# Patient Record
Sex: MALE | Race: WHITE | NOT HISPANIC OR LATINO | Employment: FULL TIME | URBAN - METROPOLITAN AREA
[De-identification: names, ages, dates, MRNs, and addresses within clinical notes are randomized per-mention and may not be internally consistent; named-entity substitution may affect disease eponyms.]

---

## 2023-03-17 ENCOUNTER — TELEMEDICINE (OUTPATIENT)
Dept: PRIMARY CARE | Facility: CLINIC | Age: 43
End: 2023-03-17
Payer: COMMERCIAL

## 2023-03-17 DIAGNOSIS — R05.8 POST-VIRAL COUGH SYNDROME: Primary | ICD-10-CM

## 2023-03-17 PROBLEM — G47.33 OSA (OBSTRUCTIVE SLEEP APNEA): Status: ACTIVE | Noted: 2023-03-17

## 2023-03-17 PROBLEM — I10 HYPERTENSION: Status: ACTIVE | Noted: 2023-03-17

## 2023-03-17 PROBLEM — E03.9 HYPOTHYROIDISM: Status: ACTIVE | Noted: 2023-03-17

## 2023-03-17 PROBLEM — F41.8 SITUATIONAL ANXIETY: Status: ACTIVE | Noted: 2023-03-17

## 2023-03-17 PROBLEM — E66.9 OBESITY (BMI 30.0-34.9): Status: ACTIVE | Noted: 2023-03-17

## 2023-03-17 PROBLEM — F41.8 DEPRESSION WITH ANXIETY: Status: ACTIVE | Noted: 2023-03-17

## 2023-03-17 PROBLEM — E78.5 HYPERLIPIDEMIA: Status: ACTIVE | Noted: 2023-03-17

## 2023-03-17 PROBLEM — E66.811 OBESITY (BMI 30.0-34.9): Status: ACTIVE | Noted: 2023-03-17

## 2023-03-17 PROCEDURE — 99213 OFFICE O/P EST LOW 20 MIN: CPT | Performed by: STUDENT IN AN ORGANIZED HEALTH CARE EDUCATION/TRAINING PROGRAM

## 2023-03-17 RX ORDER — LEVOTHYROXINE SODIUM 125 UG/1
125 TABLET ORAL DAILY
COMMUNITY
End: 2024-04-04 | Stop reason: SDUPTHER

## 2023-03-17 RX ORDER — SERTRALINE HYDROCHLORIDE 100 MG/1
1.5 TABLET, FILM COATED ORAL NIGHTLY
COMMUNITY
Start: 2021-01-11 | End: 2024-04-01 | Stop reason: SDUPTHER

## 2023-03-17 RX ORDER — BENZONATATE 200 MG/1
200 CAPSULE ORAL 3 TIMES DAILY PRN
Qty: 30 CAPSULE | Refills: 0 | Status: SHIPPED | OUTPATIENT
Start: 2023-03-17 | End: 2023-03-27

## 2023-03-17 RX ORDER — LISINOPRIL AND HYDROCHLOROTHIAZIDE 10; 12.5 MG/1; MG/1
1 TABLET ORAL DAILY
COMMUNITY
Start: 2018-01-28 | End: 2023-03-31

## 2023-03-17 ASSESSMENT — ENCOUNTER SYMPTOMS
VOICE CHANGE: 0
COUGH: 1
FATIGUE: 0
WHEEZING: 0
ACTIVITY CHANGE: 0
CHEST TIGHTNESS: 0
SINUS PAIN: 0
SINUS PRESSURE: 0
TROUBLE SWALLOWING: 0
SHORTNESS OF BREATH: 0
RHINORRHEA: 0
APPETITE CHANGE: 0

## 2023-03-17 NOTE — PROGRESS NOTES
Subjective   Patient ID: Seamus Damon is a 43 y.o. male who presents for No chief complaint on file..    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   HENT:  Negative for congestion, ear pain, postnasal drip, rhinorrhea, sinus pressure, sinus pain, trouble swallowing and voice change.    Respiratory:  Positive for cough. Negative for chest tightness, shortness of breath and wheezing.    Cardiovascular:  Negative for chest pain.   Allergic/Immunologic: Negative for immunocompromised state.       Objective   There were no vitals taken for this visit.    Physical Exam  Constitutional:       Appearance: Normal appearance.   HENT:      Head: Normocephalic and atraumatic.      Nose: No congestion.      Mouth/Throat:      Pharynx: No posterior oropharyngeal erythema.   Pulmonary:      Effort: Pulmonary effort is normal. No respiratory distress.      Breath sounds: No wheezing.   Neurological:      Mental Status: He is alert and oriented to person, place, and time.   Psychiatric:         Mood and Affect: Mood normal.         Behavior: Behavior normal.       Assessment/Plan   1. Post-viral cough syndrome  No allergy, asthma, or GERD symptoms to account for cough  Will treat symptomatically, recommending if symptoms persist an in person eval to listen to lungs  Reviewed with patient symptomatic care including cool mist humidifier/nasal saline/OTC decongestant/rest/fluids/acetaminophen for fever or body ache.   Red flag symptoms such as increased WOB, lethargy, inability to tolerate PO intake, signs of dehydration, and fever not responsive to tylenol require ed follow up, understood by  pt  - benzonatate (Tessalon) 200 mg capsule; Take 1 capsule (200 mg) by mouth 3 times a day as needed for cough for up to 10 days. Do not crush or chew.  Dispense: 30 capsule; Refill: 0

## 2023-03-24 DIAGNOSIS — I10 HYPERTENSION, ESSENTIAL: Primary | ICD-10-CM

## 2023-03-31 RX ORDER — LISINOPRIL AND HYDROCHLOROTHIAZIDE 10; 12.5 MG/1; MG/1
1 TABLET ORAL DAILY
Qty: 90 TABLET | Refills: 3 | Status: SHIPPED | OUTPATIENT
Start: 2023-03-31 | End: 2023-12-28 | Stop reason: SDUPTHER

## 2023-12-28 DIAGNOSIS — I10 HYPERTENSION, ESSENTIAL: ICD-10-CM

## 2023-12-28 RX ORDER — LISINOPRIL AND HYDROCHLOROTHIAZIDE 10; 12.5 MG/1; MG/1
1 TABLET ORAL DAILY
Qty: 90 TABLET | Refills: 3 | Status: SHIPPED | OUTPATIENT
Start: 2023-12-28 | End: 2024-04-04 | Stop reason: SDUPTHER

## 2024-02-28 ENCOUNTER — APPOINTMENT (OUTPATIENT)
Dept: PRIMARY CARE | Facility: CLINIC | Age: 44
End: 2024-02-28
Payer: COMMERCIAL

## 2024-04-01 DIAGNOSIS — F41.8 DEPRESSION WITH ANXIETY: ICD-10-CM

## 2024-04-01 RX ORDER — SERTRALINE HYDROCHLORIDE 100 MG/1
150 TABLET, FILM COATED ORAL NIGHTLY
Qty: 45 TABLET | Refills: 0 | Status: SHIPPED | OUTPATIENT
Start: 2024-04-01 | End: 2024-04-04 | Stop reason: SDUPTHER

## 2024-04-03 PROBLEM — Z00.00 HEALTHCARE MAINTENANCE: Status: ACTIVE | Noted: 2024-04-03

## 2024-04-03 RX ORDER — CITALOPRAM 20 MG/1
20 TABLET, FILM COATED ORAL
COMMUNITY
End: 2024-04-04 | Stop reason: WASHOUT

## 2024-04-03 NOTE — PROGRESS NOTES
Subjective   Patient ID: Seamus Damon is a 44 y.o. male who presents for Annual Exam (Pt presents for annual physical exam- ABN was given to pt and signed, pt verbalized understanding. Pt states that he would like to discuss possible OCD instead of anxiety.).    HPI     Patient presents today for annual physical.  Patient is doing well overall, they have no new concerns or issues.     WELLNESS VISIT   TDAP: 11/2021  SHINGRIX: N/A  PNEUMOVAX: N/A  CSCOPE: 6/2023 (1 polyp, benign mucosa on bx, repeat 5 years per GI)  PSA: N/A (grandfather with hx of prostate cancer)  AAA SCREEN: N/A  HEP C SCREEN: none found  CACS: none found    Exercise:  some  Smoking: none       Vision:  needs checked     Prostate cancer screening: N/A due to age  Denies family history in first degree relative.  Denies hesitancy, nocturia, or urgency.     Colon cancer screening: UTD  Denies family history in first degree relative.  Denies melena, hematochezia, constipation, diarrhea, bloating, change in bowel habits.    ROUTINE VISIT  CHRONIC CONDITIONS:     Depression and anxiety  Taking Sertraline 150 mg daily for mood.     Thinks he might have some ocd,  intrusive thoughts dictating what number of things he can buy at store,    where he can set the volume on the radio     Mild, better than in past,   overcame specific numbers of things in the grocery store by forcing himself to buy a different number than what was in his mind -  uncomfortable (very) but he did it     Has a cousin with severe ocd     Mom's whole side has depression/ anxiety issues     PHQ-9:  8  MARYJANE-7:   8    Hypothyroidism  Current dose: Synthroid 125 mcg daily.  Last TSH 11.57 (H), T4 0.9 (normal) in 2/2023    Will repeat today      Hypertension  Presently on Lisinopril-HCTZ 10-12.5 mg daily     Hyperlipidemia  Lifestyle managed, not presently on statin or aspirin therapy.    02/2023 TC/HDL ratio 5.1, HDL 47, , TRIG 289  11/2021 TC/HDL ratio 4.5, HDL 42, LDL 89, TRIG  "294      Review of Systems   All other systems reviewed and are negative.      Objective   /90 (BP Location: Right arm, Patient Position: Sitting, BP Cuff Size: Adult)   Pulse 63   Temp 36.4 °C (97.5 °F) (Temporal)   Resp 14   Ht 1.727 m (5' 8\")   Wt 94.8 kg (209 lb 1.6 oz)   SpO2 97%   BMI 31.79 kg/m²     Physical Exam  Vitals and nursing note reviewed.   Constitutional:       General: He is not in acute distress.     Appearance: Normal appearance. He is not toxic-appearing.   HENT:      Head: Normocephalic and atraumatic.   Eyes:      Extraocular Movements: Extraocular movements intact.      Pupils: Pupils are equal, round, and reactive to light.   Neck:      Thyroid: No thyromegaly.      Vascular: No hepatojugular reflux or JVD.   Cardiovascular:      Rate and Rhythm: Normal rate and regular rhythm.      Heart sounds: No murmur heard.     No friction rub. No gallop.   Pulmonary:      Effort: Pulmonary effort is normal.      Breath sounds: Normal breath sounds. No wheezing, rhonchi or rales.   Abdominal:      General: Bowel sounds are normal. There is no distension.      Palpations: Abdomen is soft. There is no mass.      Tenderness: There is no abdominal tenderness. There is no guarding.   Musculoskeletal:      Right lower leg: No edema.      Left lower leg: No edema.   Lymphadenopathy:      Cervical: No cervical adenopathy.   Skin:     General: Skin is warm and dry.   Neurological:      General: No focal deficit present.      Mental Status: He is alert and oriented to person, place, and time.      Gait: Gait normal.   Psychiatric:         Mood and Affect: Mood normal.         Behavior: Behavior normal.         Assessment/Plan   Problem List Items Addressed This Visit             ICD-10-CM    Depression with anxiety F41.8    Relevant Medications    sertraline (Zoloft) 100 mg tablet    Hypothyroidism E03.9    Relevant Medications    levothyroxine (Synthroid, Levoxyl) 125 mcg tablet    Other Relevant " Orders    TSH with reflex to Free T4 if abnormal    Healthcare maintenance - Primary Z00.00    Relevant Orders    Lipid Panel    Comprehensive Metabolic Panel    CBC     Other Visit Diagnoses         Codes    Hypertension, essential     I10    Relevant Medications    lisinopriL-hydrochlorothiazide 10-12.5 mg tablet            Follow-up in 6 mo for scales,  lifestyle check in   Follow-up in 1 year for annual physical.   Call for sooner follow-up if needed.       Scribe Attestation  By signing my name below, IKasie Scribe   attest that this documentation has been prepared under the direction and in the presence of Clair Cobian DO.

## 2024-04-04 ENCOUNTER — OFFICE VISIT (OUTPATIENT)
Dept: PRIMARY CARE | Facility: CLINIC | Age: 44
End: 2024-04-04
Payer: COMMERCIAL

## 2024-04-04 ENCOUNTER — LAB (OUTPATIENT)
Dept: LAB | Facility: LAB | Age: 44
End: 2024-04-04
Payer: COMMERCIAL

## 2024-04-04 VITALS
TEMPERATURE: 97.5 F | HEIGHT: 68 IN | HEART RATE: 63 BPM | OXYGEN SATURATION: 97 % | DIASTOLIC BLOOD PRESSURE: 90 MMHG | SYSTOLIC BLOOD PRESSURE: 132 MMHG | BODY MASS INDEX: 31.69 KG/M2 | RESPIRATION RATE: 14 BRPM | WEIGHT: 209.1 LBS

## 2024-04-04 DIAGNOSIS — Z00.00 HEALTHCARE MAINTENANCE: ICD-10-CM

## 2024-04-04 DIAGNOSIS — I10 HYPERTENSION, ESSENTIAL: ICD-10-CM

## 2024-04-04 DIAGNOSIS — E03.9 HYPOTHYROIDISM, UNSPECIFIED TYPE: ICD-10-CM

## 2024-04-04 DIAGNOSIS — Z00.00 HEALTHCARE MAINTENANCE: Primary | ICD-10-CM

## 2024-04-04 DIAGNOSIS — F41.8 DEPRESSION WITH ANXIETY: ICD-10-CM

## 2024-04-04 LAB
ALBUMIN SERPL BCP-MCNC: 4.8 G/DL (ref 3.4–5)
ALP SERPL-CCNC: 61 U/L (ref 33–120)
ALT SERPL W P-5'-P-CCNC: 16 U/L (ref 10–52)
ANION GAP SERPL CALC-SCNC: 12 MMOL/L (ref 10–20)
AST SERPL W P-5'-P-CCNC: 16 U/L (ref 9–39)
BILIRUB SERPL-MCNC: 0.5 MG/DL (ref 0–1.2)
BUN SERPL-MCNC: 17 MG/DL (ref 6–23)
CALCIUM SERPL-MCNC: 9.9 MG/DL (ref 8.6–10.6)
CHLORIDE SERPL-SCNC: 100 MMOL/L (ref 98–107)
CHOLEST SERPL-MCNC: 214 MG/DL (ref 0–199)
CHOLESTEROL/HDL RATIO: 4.5
CO2 SERPL-SCNC: 31 MMOL/L (ref 21–32)
CREAT SERPL-MCNC: 0.88 MG/DL (ref 0.5–1.3)
EGFRCR SERPLBLD CKD-EPI 2021: >90 ML/MIN/1.73M*2
ERYTHROCYTE [DISTWIDTH] IN BLOOD BY AUTOMATED COUNT: 13.2 % (ref 11.5–14.5)
GLUCOSE SERPL-MCNC: 84 MG/DL (ref 74–99)
HCT VFR BLD AUTO: 45.9 % (ref 41–52)
HDLC SERPL-MCNC: 47.2 MG/DL
HGB BLD-MCNC: 15.2 G/DL (ref 13.5–17.5)
LDLC SERPL CALC-MCNC: 140 MG/DL
MCH RBC QN AUTO: 28.6 PG (ref 26–34)
MCHC RBC AUTO-ENTMCNC: 33.1 G/DL (ref 32–36)
MCV RBC AUTO: 86 FL (ref 80–100)
NON HDL CHOLESTEROL: 167 MG/DL (ref 0–149)
NRBC BLD-RTO: 0 /100 WBCS (ref 0–0)
PLATELET # BLD AUTO: 276 X10*3/UL (ref 150–450)
POTASSIUM SERPL-SCNC: 4.3 MMOL/L (ref 3.5–5.3)
PROT SERPL-MCNC: 7.4 G/DL (ref 6.4–8.2)
RBC # BLD AUTO: 5.32 X10*6/UL (ref 4.5–5.9)
SODIUM SERPL-SCNC: 139 MMOL/L (ref 136–145)
TRIGL SERPL-MCNC: 135 MG/DL (ref 0–149)
VLDL: 27 MG/DL (ref 0–40)
WBC # BLD AUTO: 6.8 X10*3/UL (ref 4.4–11.3)

## 2024-04-04 PROCEDURE — 84443 ASSAY THYROID STIM HORMONE: CPT

## 2024-04-04 PROCEDURE — 3080F DIAST BP >= 90 MM HG: CPT | Performed by: FAMILY MEDICINE

## 2024-04-04 PROCEDURE — 36415 COLL VENOUS BLD VENIPUNCTURE: CPT

## 2024-04-04 PROCEDURE — 1036F TOBACCO NON-USER: CPT | Performed by: FAMILY MEDICINE

## 2024-04-04 PROCEDURE — 80061 LIPID PANEL: CPT

## 2024-04-04 PROCEDURE — 80053 COMPREHEN METABOLIC PANEL: CPT

## 2024-04-04 PROCEDURE — 99396 PREV VISIT EST AGE 40-64: CPT | Performed by: FAMILY MEDICINE

## 2024-04-04 PROCEDURE — 85027 COMPLETE CBC AUTOMATED: CPT

## 2024-04-04 PROCEDURE — 3075F SYST BP GE 130 - 139MM HG: CPT | Performed by: FAMILY MEDICINE

## 2024-04-04 RX ORDER — LISINOPRIL AND HYDROCHLOROTHIAZIDE 10; 12.5 MG/1; MG/1
1 TABLET ORAL DAILY
Qty: 90 TABLET | Refills: 3 | Status: SHIPPED | OUTPATIENT
Start: 2024-04-04 | End: 2025-04-04

## 2024-04-04 RX ORDER — LEVOTHYROXINE SODIUM 125 UG/1
125 TABLET ORAL DAILY
Qty: 90 TABLET | Refills: 3 | Status: SHIPPED | OUTPATIENT
Start: 2024-04-04

## 2024-04-04 RX ORDER — SERTRALINE HYDROCHLORIDE 100 MG/1
200 TABLET, FILM COATED ORAL NIGHTLY
Qty: 180 TABLET | Refills: 3 | Status: SHIPPED | OUTPATIENT
Start: 2024-04-04

## 2024-04-04 ASSESSMENT — LIFESTYLE VARIABLES: HOW OFTEN DO YOU HAVE A DRINK CONTAINING ALCOHOL: 2-4 TIMES A MONTH

## 2024-04-05 LAB — TSH SERPL-ACNC: 3.79 MIU/L (ref 0.44–3.98)

## 2024-04-11 ASSESSMENT — ANXIETY QUESTIONNAIRES
1. FEELING NERVOUS, ANXIOUS, OR ON EDGE: SEVERAL DAYS
GAD7 TOTAL SCORE: 8
4. TROUBLE RELAXING: SEVERAL DAYS
3. WORRYING TOO MUCH ABOUT DIFFERENT THINGS: SEVERAL DAYS
2. NOT BEING ABLE TO STOP OR CONTROL WORRYING: SEVERAL DAYS
6. BECOMING EASILY ANNOYED OR IRRITABLE: NEARLY EVERY DAY
5. BEING SO RESTLESS THAT IT IS HARD TO SIT STILL: NOT AT ALL
7. FEELING AFRAID AS IF SOMETHING AWFUL MIGHT HAPPEN: SEVERAL DAYS

## 2024-04-11 ASSESSMENT — PATIENT HEALTH QUESTIONNAIRE - PHQ9
8. MOVING OR SPEAKING SO SLOWLY THAT OTHER PEOPLE COULD HAVE NOTICED. OR THE OPPOSITE, BEING SO FIGETY OR RESTLESS THAT YOU HAVE BEEN MOVING AROUND A LOT MORE THAN USUAL: NOT AT ALL
3. TROUBLE FALLING OR STAYING ASLEEP OR SLEEPING TOO MUCH: NEARLY EVERY DAY
2. FEELING DOWN, DEPRESSED OR HOPELESS: SEVERAL DAYS
7. TROUBLE CONCENTRATING ON THINGS, SUCH AS READING THE NEWSPAPER OR WATCHING TELEVISION: SEVERAL DAYS
9. THOUGHTS THAT YOU WOULD BE BETTER OFF DEAD, OR OF HURTING YOURSELF: NOT AT ALL
1. LITTLE INTEREST OR PLEASURE IN DOING THINGS: NOT AT ALL
SUM OF ALL RESPONSES TO PHQ9 QUESTIONS 1 AND 2: 1
SUM OF ALL RESPONSES TO PHQ QUESTIONS 1-9: 8
6. FEELING BAD ABOUT YOURSELF - OR THAT YOU ARE A FAILURE OR HAVE LET YOURSELF OR YOUR FAMILY DOWN: NOT AT ALL
4. FEELING TIRED OR HAVING LITTLE ENERGY: MORE THAN HALF THE DAYS
5. POOR APPETITE OR OVEREATING: SEVERAL DAYS

## 2024-06-10 NOTE — PROGRESS NOTES
Subjective        Seamus Damon is a 44 y.o. male who presents for      HPI:    Dr Cobian pt       Chief Complaint   Patient presents with    Hemorrhoids     Intermittent anal pain and itching with and without bowel movements. Blood when wiping x approx 1 month  Pt has tried OTC tucks pads-provides relief but does not alleviate sxs all together  Pt denies nausea, vomiting, diarrhea, blood in stool  No hemorrhoids observed during colonoscopy on 07/20/2023  Pt has not been treated elsewhere for these sxs       Reviewed colonoscopy report in chart                 Social History     Tobacco Use   Smoking Status Never   Smokeless Tobacco Never         Review of Systems:        Objective        Vitals:    06/11/24 1429   BP: 124/87   BP Location: Left arm   Patient Position: Sitting   BP Cuff Size: Adult   Pulse: 72   Resp: 14   Temp: 36.7 °C (98.1 °F)   SpO2: 97%   Weight: 95.9 kg (211 lb 6.4 oz)       Pt is A and O x3, NAD, nontoxic, well-hydrated   CV- RRR without murmur  PULM- CTA bilaterally, normal respiratory effort  RESPIRATORY EFFORT- normal , no retractions or nasal flaring   ABD- normoactive BS's , soft, no HSM, no CVAT, NT   SKIN- anus- fissure at 6 o'clock- not currently bleeding ; small skin tag vs hemorrhoid at 11 o'clock; areas tender   PSYCH- pleasant, normal judgement and insight                    BP Readings from Last 3 Encounters:   06/11/24 124/87   04/04/24 132/90   02/06/23 131/83           Wt Readings from Last 3 Encounters:   06/11/24 95.9 kg (211 lb 6.4 oz)   04/04/24 94.8 kg (209 lb 1.6 oz)   02/06/23 94.3 kg (208 lb)         BMI Readings from Last 3 Encounters:   06/11/24 32.14 kg/m²   04/04/24 31.79 kg/m²   02/06/23 32.58 kg/m²     The number and complexity of problems addressed is considered moderate.  The amount and/or complexity of data reviewed and analyzed is considered moderate. The risk of complications and/or morbidity/mortality of patient is considered moderate. Overall,  this patient encounter is considered a moderate risk visit.    Pt aware cream prescribed is not typiccally used for hemorrhiods but not using steroid due to allergy  Cautioned on risks fof cream and how to use         Pt did not want a chaperone    Assessment/Plan      1. Hemorrhoids, unspecified hemorrhoid type  benzocaine-lidocaine-tetracaine cream    Referral to General Surgery      2. Anal fissure  Referral to General Surgery      3. Skin tag of anus  Referral to General Surgery          Orders Placed This Encounter   Procedures    Referral to General Surgery   Using topical cream to numb the area - patient aware to wear glove when applying and only use small amount             Refer general surgery       Call if no better or if symptoms worsen

## 2024-06-11 ENCOUNTER — OFFICE VISIT (OUTPATIENT)
Dept: PRIMARY CARE | Facility: CLINIC | Age: 44
End: 2024-06-11
Payer: COMMERCIAL

## 2024-06-11 VITALS
SYSTOLIC BLOOD PRESSURE: 124 MMHG | DIASTOLIC BLOOD PRESSURE: 87 MMHG | BODY MASS INDEX: 32.14 KG/M2 | TEMPERATURE: 98.1 F | RESPIRATION RATE: 14 BRPM | OXYGEN SATURATION: 97 % | WEIGHT: 211.4 LBS | HEART RATE: 72 BPM

## 2024-06-11 DIAGNOSIS — K60.2 ANAL FISSURE: ICD-10-CM

## 2024-06-11 DIAGNOSIS — K64.4 SKIN TAG OF ANUS: ICD-10-CM

## 2024-06-11 DIAGNOSIS — K64.9 HEMORRHOIDS, UNSPECIFIED HEMORRHOID TYPE: Primary | ICD-10-CM

## 2024-06-11 PROCEDURE — 3074F SYST BP LT 130 MM HG: CPT | Performed by: FAMILY MEDICINE

## 2024-06-11 PROCEDURE — 1036F TOBACCO NON-USER: CPT | Performed by: FAMILY MEDICINE

## 2024-06-11 PROCEDURE — 99214 OFFICE O/P EST MOD 30 MIN: CPT | Performed by: FAMILY MEDICINE

## 2024-06-11 PROCEDURE — 3079F DIAST BP 80-89 MM HG: CPT | Performed by: FAMILY MEDICINE

## 2024-06-18 ENCOUNTER — PREP FOR PROCEDURE (OUTPATIENT)
Dept: SURGERY | Facility: CLINIC | Age: 44
End: 2024-06-18

## 2024-06-18 ENCOUNTER — APPOINTMENT (OUTPATIENT)
Dept: SURGERY | Facility: CLINIC | Age: 44
End: 2024-06-18
Payer: COMMERCIAL

## 2024-06-18 VITALS
WEIGHT: 210.4 LBS | DIASTOLIC BLOOD PRESSURE: 82 MMHG | TEMPERATURE: 98.2 F | HEART RATE: 91 BPM | RESPIRATION RATE: 16 BRPM | BODY MASS INDEX: 31.89 KG/M2 | OXYGEN SATURATION: 97 % | SYSTOLIC BLOOD PRESSURE: 115 MMHG | HEIGHT: 68 IN

## 2024-06-18 DIAGNOSIS — K64.4 SKIN TAG OF ANUS: ICD-10-CM

## 2024-06-18 DIAGNOSIS — K62.0 ANAL POLYP: Primary | ICD-10-CM

## 2024-06-18 DIAGNOSIS — K60.2 ANAL FISSURE: ICD-10-CM

## 2024-06-18 DIAGNOSIS — K64.9 HEMORRHOIDS, UNSPECIFIED HEMORRHOID TYPE: ICD-10-CM

## 2024-06-18 PROCEDURE — 99205 OFFICE O/P NEW HI 60 MIN: CPT | Performed by: SURGERY

## 2024-06-18 PROCEDURE — 3079F DIAST BP 80-89 MM HG: CPT | Performed by: SURGERY

## 2024-06-18 PROCEDURE — 3074F SYST BP LT 130 MM HG: CPT | Performed by: SURGERY

## 2024-06-18 PROCEDURE — 1036F TOBACCO NON-USER: CPT | Performed by: SURGERY

## 2024-06-18 RX ORDER — HYDROCODONE BITARTRATE AND ACETAMINOPHEN 5; 325 MG/1; MG/1
1 TABLET ORAL EVERY 6 HOURS PRN
Qty: 12 TABLET | Refills: 0 | Status: SHIPPED | OUTPATIENT
Start: 2024-06-18 | End: 2024-06-21

## 2024-06-18 RX ORDER — SODIUM CHLORIDE, SODIUM LACTATE, POTASSIUM CHLORIDE, CALCIUM CHLORIDE 600; 310; 30; 20 MG/100ML; MG/100ML; MG/100ML; MG/100ML
100 INJECTION, SOLUTION INTRAVENOUS CONTINUOUS
OUTPATIENT
Start: 2024-06-18

## 2024-06-18 NOTE — PROGRESS NOTES
"History Of Present Illness :  Seamus Damon is a very pleasant 44 y.o. male, patient of Dr. Clair Cobian,  who presents on referral from Dr. Kathleen Hollingsworth, for an anorectal evaluation.  The patient was recently seen by Dr. Hollingsworth on 6/11/24, and that note was reviewed.  According to the note the patient presented with:     \"Intermittent anal pain and itching with and without bowel movements. Blood when wiping x approx 1 month  Pt has tried OTC tucks pads-provides relief but does not alleviate sxs all together  Pt denies nausea, vomiting, diarrhea, blood in stool  No hemorrhoids observed during colonoscopy on 07/20/2023  Pt has not been treated elsewhere for these sxs\"     On examination on that visit, the patient was found to have: \"SKIN- anus- fissure at 6 o'clock- not currently bleeding ; small skin tag vs hemorrhoid at 11 o'clock; areas tender\"     The patient had a recent colonoscopy less than a year ago on 7/20/2023 by Dr. Hyde at the [facility], this was essentially negative.  A 5 mm polyp was identified in the ascending colon with the pathology revealed unremarkable colonic mucosa.  There was no mention of hemorrhoidal disease.    To me, the patient notes a perianal lump since early May.  He has tried over-the-counter remedies such as Tucks and Preparation H without any sustained relief.  He notes an occasional sharp pain.  He notes constant itching.  He notes occasional blood on the toilet paper but no dripping of blood in the commode.  He is never had an anorectal or abdominal operation in the past.    Patient has a number of well-controlled medical problems including hypertension, hyperlipidemia, hypothyroidism, depression, anxiety, and DM.  His only operations have been wisdom tooth extraction and vasectomy.    Patient is  and has 2 sons.  He lives in [place].  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        Past Medical History   Past Medical History:   Diagnosis Date    Personal " history of other diseases of the circulatory system     History of hypertension    Personal history of other diseases of the nervous system and sense organs     History of sleep apnea    Personal history of other endocrine, nutritional and metabolic disease     History of obesity    Personal history of other endocrine, nutritional and metabolic disease     History of hypothyroidism    Personal history of other specified conditions     History of fatigue        Surgical History    No past surgical history on file.     Allergies   Allergies   Allergen Reactions    Feathers Other     Sinus mucous- down feathers    Prednisone Rash        Home Meds  Current Outpatient Medications   Medication Instructions    benzocaine-lidocaine-tetracaine cream Use topically - sparingly to outside of affected area only once daily    levothyroxine (SYNTHROID, LEVOXYL) 125 mcg, oral, Daily    lidocaine 3 % cream apply sparingly to affected area once daily, use glove    lisinopriL-hydrochlorothiazide 10-12.5 mg tablet 1 tablet, oral, Daily    sertraline (ZOLOFT) 200 mg, oral, Nightly        Family History    Family History   Problem Relation Name Age of Onset    Hyperlipidemia Mother      Hypertension Mother      Skin cancer Mother      Prostate cancer Paternal Grandfather      Heart failure Other Grandmother     Heart failure Other Grandfather     Coronary artery disease Paternal Great-Grandfather          Social History  Social History     Tobacco Use    Smoking status: Never    Smokeless tobacco: Never   Vaping Use    Vaping status: Never Used   Substance Use Topics    Alcohol use: Not Currently    Drug use: Never        Review Of Systems    Review of Systems    General: Not Present- Obesity, Cancer, HIV, MRSA, Recent Cold/Flu, Tired During the Day and VRE.  HEENT: Not Present- Migraine, Cataracts, Glaucoma, Macular Degeneration and Retinal Detachment.  Respiratory:Not Present- Asthma, Chronic Cough, Difficulty Breathing on Exertion,  Difficulty Breathing at Rest, Emphysema, Frequent Bronchitis, Home CPAP/BiPAP, Home Oxygen, Pulmonary Embolus, Pneumonia/TB  Cardiovascular: Not Present- Chest Pain, Congestive Heart Failure, Heart Attack, Coronary Artery Disease, Heart Stent, Internal Defibrillator, Irregular Heart Beat, Mitral Valve Prolapse, Murmur, Pacemaker and Peripheral Vascular Disease.  Gastrointestinal: Not Present- Heartburn, Hepatitis, Hiatal Hernia, Jaundice, Reflux, Stomach Ulcer and IBS.  Male Genitourinary: Not Present- Enlarged Prostate, Kidney Failure, Kidney Stones, Dialysis and Urinary Tract Infection.  Musculoskeletal: Not Present- Arthritis, Back Pain and Fibromyalgia.  Neurological: Not Present- Headaches, Numbness, Tingling, Seizures, Stroke,  Shunt and Weakness.  Psychiatric: Not Present-  Bipolar and Panic Attacks.  Endocrine: Not Present- Diabetes, Hyperthyroidism, and Low Blood Sugar.  Hematology: Not Present- Abnormal Bleeding, Anemia and Blood Clots.    Vitals  There were no vitals taken for this visit.     Physical Exam   General Complete Physical Exam    The physical exam findings are as follows:    General Appearance - Cooperative and Well groomed. Not in acute distress. Build & Nutrition - Well nourished.  Posture - Normal posture. Gait - Normal. Hydration - Well hydrated.    Integumentary  General Characteristics: Overall examination of the patient's skin reveals - no rashes, no suspicious lesions, no bruises and no evidence of scars. Color - normal coloration of skin. Skin Moisture - normal skin moisture. Temperature - normal  warmth is noted. Texture - normal skin texture.    Head and Neck  Head - normocephalic, atraumatic with no lesions or palpable masses.  Neck  Global Assessment - full range of motion. no bruit auscultated on the right, no bruit auscultated on the left, non-tender, no lymphadenopathy, no palpable mass on the right and no palpable mass on the left.  Trachea - midline.  Thyroid Gland  Characteristics - no palpable nodules, normal position, symmetric and smooth.    Eyes  Sclera/Conjunctiva - Bilateral - Normal. Pupil - Bilateral - Accommodating, Direct reaction to light normal and Equal.    ENMT  Global Assessment  Upon examination of the ears, nose, mouth and throat - examination of the oral cavity reveals normal lips, teeth, gums and oral mucosa and hard and soft palates, tongue, tonsils and posterior pharynx are moist and symmetric without lesions.    Chest and Lung Exam  Inspection: Shape - Symmetric. Movements - Symmetrical. Accessory muscles - No use of accessory muscles in breathing.  Percussion:  Quality and Intensity: - Percussion normal.  Palpation: - Palpation normal.  Auscultation:  Breath sounds: - Normal and equal bilaterally.  Adventitious sounds: - none bilaterally.    Cardiovascular  Inspection: Carotid artery - Bilateral - Inspection Normal.  Palpation/Percussion: Examination by palpation and percussion reveals - No Thrills.  Cardiac Borders - Normal.  Auscultation: Rhythm - Regular. Rate: Regular.  Heart Sounds - Normal heart sounds, S1 WNL and S2 WNL.  Murmurs & Other Heart Sounds: Auscultation of the heart reveals - No Murmurs or other extra heart sounds.    Peripheral Vascular  Lower Extremity: Inspection - Bilateral - No Varicose veins.  Palpation: Edema - Bilateral - No edema.    Musculoskeletal  Global Assessment  Right Upper Extremity - no deformities, masses or tenderness, no known fractures, normal strength and tone and  normal range of motion without pain. Left Upper Extremity - no deformities, masses or tenderness, no known fractures,  normal strength and tone and normal range of motion without pain. Right Lower Extremity - no deformities, masses or  tenderness, no known fractures, normal strength and tone and normal range of motion without pain. Left Lower  Extremity - no deformities, masses or tenderness, no known fractures, normal strength and tone and normal range  of  motion without pain.    Lymphatic  Head & Neck  General Head & Neck Lymphatics:  Bilateral: Description - Normal.  Axillary  General Axillary Region:  Bilateral: Description - Normal.  Femoral & Inguinal  Generalized Femoral & Inguinal Lymphatics:  Bilateral: Description - Normal.    Abdomen  Inspection: Inspection of the abdomen reveals - No Visible peristalsis, No Abnormal pulsations, No Paradoxical  movements and No Hernias.  Palpation/Percussion: Palpation and Percussion of the abdomen reveal - Non Tender, No Rebound tenderness, NoRigidity (guarding) and No Palpable abdominal masses.  Liver: - Normal.  Spleen: - Normal.  Auscultation: Auscultation of the abdomen reveals - Bowel sounds normal and No Abdominal bruits.  Surgical scars:  none    Inguinal and External Genitalia    The patient was examined supine, and standing, with and without Valsalva. There is no evidence of inguinal or femoral hernia or lymphadenopathy bilaterally. The testes are descended bilaterally and symmetric, with no masses, nodules, or tenderness.    Rectal    Freda, my medical assistant, assisted and chaperoned  Patient examined in the prone jackknife position on the proctoscopy table.    Anorectal Exam:     External -right anteriorly, there is an anal polyp which is firm and irregular measuring about 15 mm - 20 mm in sagittally and about 10 mm transversely -there is a small ulceration about 3 to 4 mm on the posterior aspect of this polyp; no evidence of fistula, fissure, or external hemorrhoid; otherwise normal external exam.    Internal - normal sphincter tone. No rectal mass.  Prostate is normal in size and is smooth with no nodules.    Residue - not melanotic and no blood.    Assessment/Plan   Mr. Damon has a symptomatic anal polyp right anteriorly, as described.    Recommendations:    Conservative therapy is not possible with regard to symptom relief, as the patient is allergic to prednisone.  This causes erythema multiforme.   Therefore, I do not advise using hydrocortisone cream.    Therefore, in order to relieve symptoms and establish diagnosis excision is indicated and recommended.      Will proceed with this on Friday, 6/28/2024 as an outpatient under general esthesia at [facility].  The patient be in the prone position.  This will be a rectal exam under anesthesia, diagnostic anoscopy, excision of anal polyp, and possible hemorrhoidal rubber band ligation.  He may have a bleeding component from mild internal hemorrhoids.  The anal polyp is not causing the bleeding.    Recent CBC and CMP from 4/4/2024 were normal.  Patient does not require preoperative EKG.    I have asked the patient to administer a fleets enema at home the morning of the operation prior to leaving for the hospital.    I will see the patient for a postoperative appointment on Tuesday, 7/9/2024 at my [facility] office.    For postoperative analgesia/pain relief, I recommend:     a.  Tylenol Extra Strength 500 mg with ibuprofen 600 mg (three, 200 mg Advil or Motrin tablets ) 4 times a day with food, on schedule, for 2-3 days, then as needed thereafter.      b.  Supplement with Norco 5/325, dispense #12 for more severe or breakthrough pain, as needed.  This has been electronically prescribed to your pharmacy.  Please  this prescription within 7 days of today's visit, or the pharmacist will not fill the prescription.    Anorectal Surgery Consent: The procedure was explained to the patient in detail, including the risks, benefits and alternatives. The risks include, but are not limited to, infection, abscess, bleeding, hematoma, seroma, poor wound healing, persistent or recurrent symptoms, need for further surgery, fistula, key-hole defect and fecal incontinence.  The patient agreed with the plan and signed the electronic consent.

## 2024-06-19 ENCOUNTER — ANESTHESIA EVENT (OUTPATIENT)
Dept: OPERATING ROOM | Facility: HOSPITAL | Age: 44
End: 2024-06-19
Payer: COMMERCIAL

## 2024-06-27 NOTE — PREPROCEDURE INSTRUCTIONS
Current Medications   Medication Instructions    levothyroxine (Synthroid, Levoxyl) 125 mcg tablet Take morning of surgery with sip of water, no other fluids    lisinopriL-hydrochlorothiazide 10-12.5 mg tablet Stop 1 day before surgery    sertraline (Zoloft) 100 mg tablet Continue until night before surgery          NPO Instructions: Nothing to eat after midnight tonight      Additional Instructions:     Enter through main entrance of [facility], located at [address] Proceed to registration, located in the back right hand corner. You will need your ID and insurance card for registration. Please ensure you have a responsible adult to drive you home.     Take a shower the morning of or night before your procedure. After you shower avoid lotions, powders, deodorants or anything applied to the skin. If you wear contacts or glasses, wear the glasses. If you do not have glasses, please bring a case for your contacts. You may wear hearing aids and dentures, bring a case for them or we will provide one. Make sure you wear something loose and comfortable. Keep in mind your surgery type and wear something that will accommodate incisions or bandages. Please remove all jewelry.     You may have up to 13.5 ounces of clear liquids 2 hours before *  your instructed ARRIVAL time  (10:00)* to the hospital. You may take medications discussed during phone call with a small sip of water.    For further questions Stevie HOUSER can be contacted at [phone] between 7AM-3PM.

## 2024-06-28 ENCOUNTER — HOSPITAL ENCOUNTER (OUTPATIENT)
Facility: HOSPITAL | Age: 44
Setting detail: OUTPATIENT SURGERY
Discharge: HOME | End: 2024-06-28
Attending: SURGERY | Admitting: SURGERY
Payer: COMMERCIAL

## 2024-06-28 ENCOUNTER — ANESTHESIA (OUTPATIENT)
Dept: OPERATING ROOM | Facility: HOSPITAL | Age: 44
End: 2024-06-28
Payer: COMMERCIAL

## 2024-06-28 VITALS
WEIGHT: 209.44 LBS | HEIGHT: 68 IN | RESPIRATION RATE: 16 BRPM | BODY MASS INDEX: 31.74 KG/M2 | TEMPERATURE: 96.8 F | SYSTOLIC BLOOD PRESSURE: 163 MMHG | DIASTOLIC BLOOD PRESSURE: 108 MMHG | OXYGEN SATURATION: 99 % | HEART RATE: 60 BPM

## 2024-06-28 DIAGNOSIS — K62.0 ANAL POLYP: Primary | ICD-10-CM

## 2024-06-28 PROCEDURE — 46220 EXCISE ANAL EXT TAG/PAPILLA: CPT | Performed by: SURGERY

## 2024-06-28 PROCEDURE — 93010 ELECTROCARDIOGRAM REPORT: CPT | Performed by: INTERNAL MEDICINE

## 2024-06-28 PROCEDURE — 2500000005 HC RX 250 GENERAL PHARMACY W/O HCPCS: Performed by: ANESTHESIOLOGIST ASSISTANT

## 2024-06-28 PROCEDURE — A46220 PR EXCISION SINGLE EXTERNAL PAPILLA OR TAG ANUS: Performed by: ANESTHESIOLOGIST ASSISTANT

## 2024-06-28 PROCEDURE — 3700000001 HC GENERAL ANESTHESIA TIME - INITIAL BASE CHARGE: Performed by: SURGERY

## 2024-06-28 PROCEDURE — 93005 ELECTROCARDIOGRAM TRACING: CPT | Mod: 59

## 2024-06-28 PROCEDURE — 3600000002 HC OR TIME - INITIAL BASE CHARGE - PROCEDURE LEVEL TWO: Performed by: SURGERY

## 2024-06-28 PROCEDURE — 2500000005 HC RX 250 GENERAL PHARMACY W/O HCPCS: Performed by: SURGERY

## 2024-06-28 PROCEDURE — 7100000009 HC PHASE TWO TIME - INITIAL BASE CHARGE: Performed by: SURGERY

## 2024-06-28 PROCEDURE — 3700000002 HC GENERAL ANESTHESIA TIME - EACH INCREMENTAL 1 MINUTE: Performed by: SURGERY

## 2024-06-28 PROCEDURE — 2500000004 HC RX 250 GENERAL PHARMACY W/ HCPCS (ALT 636 FOR OP/ED): Performed by: ANESTHESIOLOGIST ASSISTANT

## 2024-06-28 PROCEDURE — 7100000010 HC PHASE TWO TIME - EACH INCREMENTAL 1 MINUTE: Performed by: SURGERY

## 2024-06-28 PROCEDURE — 7100000002 HC RECOVERY ROOM TIME - EACH INCREMENTAL 1 MINUTE: Performed by: SURGERY

## 2024-06-28 PROCEDURE — 3600000007 HC OR TIME - EACH INCREMENTAL 1 MINUTE - PROCEDURE LEVEL TWO: Performed by: SURGERY

## 2024-06-28 PROCEDURE — 7100000001 HC RECOVERY ROOM TIME - INITIAL BASE CHARGE: Performed by: SURGERY

## 2024-06-28 PROCEDURE — A46220 PR EXCISION SINGLE EXTERNAL PAPILLA OR TAG ANUS: Performed by: ANESTHESIOLOGY

## 2024-06-28 PROCEDURE — 2720000007 HC OR 272 NO HCPCS: Performed by: SURGERY

## 2024-06-28 RX ORDER — ONDANSETRON HYDROCHLORIDE 2 MG/ML
4 INJECTION, SOLUTION INTRAVENOUS ONCE AS NEEDED
Status: DISCONTINUED | OUTPATIENT
Start: 2024-06-28 | End: 2024-06-28 | Stop reason: HOSPADM

## 2024-06-28 RX ORDER — SODIUM CHLORIDE, SODIUM LACTATE, POTASSIUM CHLORIDE, CALCIUM CHLORIDE 600; 310; 30; 20 MG/100ML; MG/100ML; MG/100ML; MG/100ML
100 INJECTION, SOLUTION INTRAVENOUS CONTINUOUS
Status: DISCONTINUED | OUTPATIENT
Start: 2024-06-28 | End: 2024-06-28 | Stop reason: HOSPADM

## 2024-06-28 RX ORDER — HYDROMORPHONE HYDROCHLORIDE 1 MG/ML
1 INJECTION, SOLUTION INTRAMUSCULAR; INTRAVENOUS; SUBCUTANEOUS EVERY 5 MIN PRN
Status: DISCONTINUED | OUTPATIENT
Start: 2024-06-28 | End: 2024-06-28 | Stop reason: HOSPADM

## 2024-06-28 RX ORDER — PROPOFOL 10 MG/ML
INJECTION, EMULSION INTRAVENOUS AS NEEDED
Status: DISCONTINUED | OUTPATIENT
Start: 2024-06-28 | End: 2024-06-28

## 2024-06-28 RX ORDER — DIPHENHYDRAMINE HYDROCHLORIDE 50 MG/ML
25 INJECTION INTRAMUSCULAR; INTRAVENOUS ONCE AS NEEDED
Status: DISCONTINUED | OUTPATIENT
Start: 2024-06-28 | End: 2024-06-28 | Stop reason: HOSPADM

## 2024-06-28 RX ORDER — HYDROCODONE BITARTRATE AND ACETAMINOPHEN 5; 325 MG/1; MG/1
1 TABLET ORAL EVERY 6 HOURS PRN
Qty: 12 TABLET | Refills: 0 | Status: SHIPPED | OUTPATIENT
Start: 2024-06-28

## 2024-06-28 RX ORDER — MORPHINE SULFATE 2 MG/ML
2 INJECTION, SOLUTION INTRAMUSCULAR; INTRAVENOUS EVERY 5 MIN PRN
Status: DISCONTINUED | OUTPATIENT
Start: 2024-06-28 | End: 2024-06-28 | Stop reason: HOSPADM

## 2024-06-28 RX ORDER — KETOROLAC TROMETHAMINE 30 MG/ML
INJECTION, SOLUTION INTRAMUSCULAR; INTRAVENOUS AS NEEDED
Status: DISCONTINUED | OUTPATIENT
Start: 2024-06-28 | End: 2024-06-28

## 2024-06-28 RX ORDER — METOPROLOL TARTRATE 1 MG/ML
5 INJECTION, SOLUTION INTRAVENOUS ONCE
Status: DISCONTINUED | OUTPATIENT
Start: 2024-06-28 | End: 2024-06-28 | Stop reason: HOSPADM

## 2024-06-28 RX ORDER — FENTANYL CITRATE 50 UG/ML
INJECTION, SOLUTION INTRAMUSCULAR; INTRAVENOUS AS NEEDED
Status: DISCONTINUED | OUTPATIENT
Start: 2024-06-28 | End: 2024-06-28

## 2024-06-28 RX ORDER — LIDOCAINE HCL/PF 100 MG/5ML
SYRINGE (ML) INTRAVENOUS AS NEEDED
Status: DISCONTINUED | OUTPATIENT
Start: 2024-06-28 | End: 2024-06-28

## 2024-06-28 RX ORDER — LABETALOL HYDROCHLORIDE 5 MG/ML
10 INJECTION, SOLUTION INTRAVENOUS ONCE AS NEEDED
Status: DISCONTINUED | OUTPATIENT
Start: 2024-06-28 | End: 2024-06-28 | Stop reason: HOSPADM

## 2024-06-28 RX ORDER — BUPIVACAINE HCL/EPINEPHRINE 0.5-1:200K
VIAL (ML) INJECTION AS NEEDED
Status: DISCONTINUED | OUTPATIENT
Start: 2024-06-28 | End: 2024-06-28 | Stop reason: HOSPADM

## 2024-06-28 RX ORDER — LIDOCAINE HYDROCHLORIDE 10 MG/ML
INJECTION INFILTRATION; PERINEURAL AS NEEDED
Status: DISCONTINUED | OUTPATIENT
Start: 2024-06-28 | End: 2024-06-28 | Stop reason: HOSPADM

## 2024-06-28 RX ORDER — MEPERIDINE HYDROCHLORIDE 25 MG/ML
12.5 INJECTION INTRAMUSCULAR; INTRAVENOUS; SUBCUTANEOUS EVERY 10 MIN PRN
Status: DISCONTINUED | OUTPATIENT
Start: 2024-06-28 | End: 2024-06-28 | Stop reason: HOSPADM

## 2024-06-28 RX ORDER — SUCCINYLCHOLINE CHLORIDE 20 MG/ML INJECTION SOLUTION
SOLUTION AS NEEDED
Status: DISCONTINUED | OUTPATIENT
Start: 2024-06-28 | End: 2024-06-28

## 2024-06-28 RX ORDER — HYDRALAZINE HYDROCHLORIDE 20 MG/ML
10 INJECTION INTRAMUSCULAR; INTRAVENOUS EVERY 30 MIN PRN
Status: DISCONTINUED | OUTPATIENT
Start: 2024-06-28 | End: 2024-06-28 | Stop reason: HOSPADM

## 2024-06-28 RX ORDER — ALBUTEROL SULFATE 0.83 MG/ML
2.5 SOLUTION RESPIRATORY (INHALATION) ONCE AS NEEDED
Status: DISCONTINUED | OUTPATIENT
Start: 2024-06-28 | End: 2024-06-28 | Stop reason: HOSPADM

## 2024-06-28 RX ORDER — GLYCOPYRROLATE 0.2 MG/ML
INJECTION INTRAMUSCULAR; INTRAVENOUS AS NEEDED
Status: DISCONTINUED | OUTPATIENT
Start: 2024-06-28 | End: 2024-06-28

## 2024-06-28 RX ORDER — FAMOTIDINE 10 MG/ML
20 INJECTION INTRAVENOUS ONCE
Status: DISCONTINUED | OUTPATIENT
Start: 2024-06-28 | End: 2024-06-28 | Stop reason: HOSPADM

## 2024-06-28 RX ORDER — MIDAZOLAM HYDROCHLORIDE 1 MG/ML
INJECTION, SOLUTION INTRAMUSCULAR; INTRAVENOUS AS NEEDED
Status: DISCONTINUED | OUTPATIENT
Start: 2024-06-28 | End: 2024-06-28

## 2024-06-28 RX ORDER — ROCURONIUM BROMIDE 10 MG/ML
INJECTION, SOLUTION INTRAVENOUS AS NEEDED
Status: DISCONTINUED | OUTPATIENT
Start: 2024-06-28 | End: 2024-06-28

## 2024-06-28 RX ORDER — ONDANSETRON HYDROCHLORIDE 2 MG/ML
INJECTION, SOLUTION INTRAVENOUS AS NEEDED
Status: DISCONTINUED | OUTPATIENT
Start: 2024-06-28 | End: 2024-06-28

## 2024-06-28 RX ORDER — ACETAMINOPHEN 325 MG/1
975 TABLET ORAL ONCE
Status: DISCONTINUED | OUTPATIENT
Start: 2024-06-28 | End: 2024-06-28 | Stop reason: HOSPADM

## 2024-06-28 RX ORDER — MIDAZOLAM HYDROCHLORIDE 1 MG/ML
1 INJECTION, SOLUTION INTRAMUSCULAR; INTRAVENOUS ONCE AS NEEDED
Status: DISCONTINUED | OUTPATIENT
Start: 2024-06-28 | End: 2024-06-28 | Stop reason: HOSPADM

## 2024-06-28 RX ORDER — SCOLOPAMINE TRANSDERMAL SYSTEM 1 MG/1
1 PATCH, EXTENDED RELEASE TRANSDERMAL ONCE
Status: DISCONTINUED | OUTPATIENT
Start: 2024-06-28 | End: 2024-06-28 | Stop reason: HOSPADM

## 2024-06-28 SDOH — HEALTH STABILITY: MENTAL HEALTH: CURRENT SMOKER: 0

## 2024-06-28 ASSESSMENT — COLUMBIA-SUICIDE SEVERITY RATING SCALE - C-SSRS
6. HAVE YOU EVER DONE ANYTHING, STARTED TO DO ANYTHING, OR PREPARED TO DO ANYTHING TO END YOUR LIFE?: NO
2. HAVE YOU ACTUALLY HAD ANY THOUGHTS OF KILLING YOURSELF?: NO
1. IN THE PAST MONTH, HAVE YOU WISHED YOU WERE DEAD OR WISHED YOU COULD GO TO SLEEP AND NOT WAKE UP?: NO

## 2024-06-28 ASSESSMENT — PAIN - FUNCTIONAL ASSESSMENT
PAIN_FUNCTIONAL_ASSESSMENT: 0-10

## 2024-06-28 ASSESSMENT — PAIN SCALES - GENERAL
PAINLEVEL_OUTOF10: 0 - NO PAIN

## 2024-06-28 NOTE — ANESTHESIA POSTPROCEDURE EVALUATION
Patient: Seamus Damon    Procedure Summary       Date: 06/28/24 Room / Location: [facility] OR 08 / Virtual [facility] OR    Anesthesia Start: 1124 Anesthesia Stop: 1212    Procedure: RECTAL EXAM/ DIAGNOSTIC ANOSCOPY/ ANAL POLY EXCISION/ (Anus) Diagnosis:       Anal polyp      (Anal polyp [K62.0])    Surgeons: Mamadou Hernandez MD Responsible Provider: Julian Fulton MD    Anesthesia Type: general ASA Status: 3            Anesthesia Type: general    Vitals Value Taken Time   /103 06/28/24 1210   Temp 36.0 06/28/24 1212   Pulse 93 06/28/24 1212   Resp 14 06/28/24 1212   SpO2 100 06/28/24 1212   Vitals shown include unfiled device data.    Anesthesia Post Evaluation    Patient participation: complete - patient participated  Level of consciousness: awake  Pain management: adequate  Airway patency: patent  Cardiovascular status: acceptable  Respiratory status: acceptable  Hydration status: acceptable  Postoperative Nausea and Vomiting: none        No notable events documented.

## 2024-06-28 NOTE — ANESTHESIA PROCEDURE NOTES
Airway  Date/Time: 6/28/2024 11:32 AM  Urgency: elective    Airway not difficult    Staffing  Performed: IVAN   Authorized by: Julian Fulton MD    Performed by: IVAN Bennett  Patient location during procedure: OR    Indications and Patient Condition  Indications for airway management: anesthesia and airway protection  Spontaneous ventilation: present  Sedation level: deep  Preoxygenated: yes  MILS maintained throughout  Mask difficulty assessment: 0 - not attempted    Final Airway Details  Final airway type: endotracheal airway      Successful airway: ETT  Cuffed: yes   Successful intubation technique: direct laryngoscopy  Facilitating devices/methods: cricoid pressure  Endotracheal tube insertion site: oral  Blade: Shelley  Blade size: #4  ETT size (mm): 8.0  Cormack-Lehane Classification: grade I - full view of glottis  Placement verified by: chest auscultation and capnometry   Measured from: teeth  ETT to teeth (cm): 19  Number of attempts at approach: 1  Number of other approaches attempted: 0

## 2024-06-28 NOTE — ANESTHESIA PREPROCEDURE EVALUATION
Patient: Seamus Damon    Procedure Information       Date/Time: 06/28/24 1125    Procedure: RECTAL EXAM/ DIAGNOSTIC ANOSCOPY/ ANAL POLY EXCISION/ POSSIBLE HEMORRHOIDAL RUBBER BAND LIGATION (Anus) - Rectal exam under anesthesia; diagnostic anoscopy; excision of anal polyp; possible hemorrhoidal rubber band ligation; general anesthesia; prone position; please have suction rubber band ligator in the room    Location: [facility] / [facility]    Surgeons: Mamadou Hernandez MD            Relevant Problems   Anesthesia   (-) Difficult intubation   (-) PONV (postoperative nausea and vomiting)      Cardiac   (+) Hyperlipidemia   (+) Hypertension      Pulmonary   (+) DM (obstructive sleep apnea)      Neuro   (+) Depression with anxiety   (+) Situational anxiety      Endocrine   (+) Hypothyroidism       Clinical information reviewed:    Allergies  Meds               NPO Detail:  NPO/Void Status  Date of Last Liquid: 06/28/24  Time of Last Liquid: 0600  Date of Last Solid: 06/27/24  Time of Last Solid: 1300  Last Intake Type: Clear fluids         Physical Exam    Airway  Mallampati: III  TM distance: >3 FB  Neck ROM: full     Cardiovascular - normal exam  Rhythm: regular  Rate: normal     Dental    Pulmonary - normal exam     Abdominal            Anesthesia Plan    History of general anesthesia?: yes  History of complications of general anesthesia?: no    ASA 3     general     The patient is not a current smoker.  Education provided regarding risk of obstructive sleep apnea.  intravenous induction   Postoperative administration of opioids is intended.  Trial extubation is planned.  Anesthetic plan and risks discussed with patient.    Plan discussed with CRNA, CAA and attending.

## 2024-06-28 NOTE — OP NOTE
RECTAL EXAM/ DIAGNOSTIC ANOSCOPY/ ANAL POLY EXCISION/ POSSIBLE HEMORRHOIDAL RUBBER BAND LIGATION Operative Note     Date: 2024  OR Location: [facility]    Name: Seamus Damon : 1980, Age: 44 y.o., MRN: [id number], Sex: male    Diagnosis  Pre-op Diagnosis     * Anal polyp [K62.0] Post-op Diagnosis     * Anal polyp [K62.0]     Procedures    Rectal exam under anesthesia    Diagnostic anoscopy    Excision of anal polyp      Surgeons      * Mamadou Hernandez - Primary    Resident/Fellow/Other Assistant:  RONALD Holcomb    Procedure Summary  Anesthesia: Anesthesia type not filed in the log.  ASA: ASA status not filed in the log.  Anesthesia Staff: Anesthesiologist: Julain Fulton MD  Estimated Blood Loss: < 3 mL  Intra-op Medications: Administrations occurring from 1125 to 1255 on 24:  * No intraprocedure medications in log *        Specimen: See below    Staff:   Circulator:   Scrub Person:          Drains and/or Catheters: * None in log *    Tourniquet Times:         Implants:     Findings: See below    Indications: Seamus Damon is an 44 y.o. male who is having surgery for Anal polyp [K62.0].     The patient was seen in the preoperative area. The risks, benefits, complications, treatment options, non-operative alternatives, expected recovery and outcomes were discussed with the patient. The possibilities of reaction to medication, pulmonary aspiration, injury to surrounding structures, bleeding, recurrent infection, the need for additional procedures, failure to diagnose a condition, and creating a complication requiring transfusion or operation were discussed with the patient. The patient concurred with the proposed plan, giving informed consent.  The site of surgery was properly noted/marked if necessary per policy. The patient has been actively warmed in preoperative area. Preoperative antibiotics are not indicated. Venous thrombosis prophylaxis have been ordered including bilateral sequential  compression devices    Procedure Details:     Findings:     Right anteriorly, there was an anal polyp which is firm and irregular measuring about 20 mm in sagittally and about 10 mm transversely; this was ulcerated in the office but the ulcer is since healed    External inspection otherwise was negative with no evidence of fissure, fistula, or external hemorrhoid    Diagnostic anoscopy was otherwise negative; there is no significant hemorrhoidal disease;  no mucosal lesions      Specimens:  Right anterior anal polyp    Procedure:      The patient was taken to the operating room.  Preoperative huddle was accomplished with the OR team and the patient.  Satisfactory general endotracheal anesthesia was achieved while the patient was in the supine position on the operating room cart.  The patient was then placed on the operating room table in the prone jackknife position, with the standard & proper positioning and cushioning. The anal and perianal region was then prepared and draped in normal sterile fashion. After the appropriate timeout, the case commenced.    Inspection was followed by digital rectal exam and this was followed by diagnostic anoscopy. The above findings were noted.  A four-quadrant perianal block was then accomplished using a total of 25 cc of a 50-50 mixture of 1% lidocaine and 0.5% Marcaine with epinephrine    Inspection was followed by diagnostic anoscopy and the above findings were noted.    Using a Hill-Hanson retractor, the  right anterior anal canal was exposed. Proximal to the anal polyp, a 3-0 chromic suture was placed. Following this, the right anterior anal polyp was elliptically excised sharply and sent to pathology as a specimen. Hemostasis was obtained using minimal electrocautery. Using that same 3-0 chromic suture, the incision was approximated in a running locking fashion.    Following this, the anal canal was irrigated and suctioned. There was no evidence of bleeding.  Antibiotic  ointment was placed along the suture line.  A large piece of Xeroform was placed in the anal canal for hemostasis, followed by 4 x 4 gauze, ABD pad and mesh panties.    The patient tolerated the procedure well. Sponge, needle, and instrument counts were correct times 2. Total IVF were 1000 cc of crystalloid and EBL was < 3 cc.  The patient was then placed on the operating room cart in the supine position, extubated, and taken to the PACU with stable vital signs and in excellent condition.    Mamadou Hernandez M.D.  Complications:  None; patient tolerated the procedure well.    Disposition: PACU - hemodynamically stable.  Condition: stable         Additional Details: none    Attending Attestation: I performed the procedure.    Mamadou Hernandez  Phone Number: [phone]

## 2024-06-28 NOTE — DISCHARGE INSTRUCTIONS
For postoperative analgesia/pain relief, I recommend:      a.  Tylenol Extra Strength 500 mg with ibuprofen 600 mg (three, 200 mg Advil or Motrin tablets ) 4 times a day with food, on schedule, for 2-3 days, then as needed thereafter.       b.  Supplement with Norco 5/325, dispense #12 for more severe or breakthrough pain, as needed.

## 2024-06-28 NOTE — SIGNIFICANT EVENT
After visit summary reviewed with the pt and pt family members. Education was also provided regarding post op care by Dr Hernandez while at bedside. Pt and family does not further questions at this time. Pt wheelchair out by staff, Family/visitor was directed to drive the vehicle to main entrance to transport the pt home.

## 2024-07-01 ENCOUNTER — TELEPHONE (OUTPATIENT)
Dept: GASTROENTEROLOGY | Facility: CLINIC | Age: 44
End: 2024-07-01
Payer: COMMERCIAL

## 2024-07-01 LAB
ATRIAL RATE: 53 BPM
P AXIS: 21 DEGREES
P OFFSET: 182 MS
P ONSET: 128 MS
PR INTERVAL: 184 MS
Q ONSET: 220 MS
QRS COUNT: 8 BEATS
QRS DURATION: 90 MS
QT INTERVAL: 408 MS
QTC CALCULATION(BAZETT): 382 MS
QTC FREDERICIA: 391 MS
R AXIS: -5 DEGREES
T AXIS: 6 DEGREES
T OFFSET: 424 MS
VENTRICULAR RATE: 53 BPM

## 2024-07-01 ASSESSMENT — PAIN SCALES - GENERAL: PAINLEVEL_OUTOF10: 0 - NO PAIN

## 2024-07-01 NOTE — TELEPHONE ENCOUNTER
"Spoke with patient who states that he has been having sporatic blood in his stool, and also that \"I'm leaking stool in the sweat of my buttocks\".  I informed the patient I would send a message to Dr. Hernandez, and as soon as I hear back I'd be in contact.  Patient verbalized understanding.   "

## 2024-07-01 NOTE — TELEPHONE ENCOUNTER
Patient called today with post op questions - patient would like a call back from medical staff to discuss

## 2024-07-08 LAB
LABORATORY COMMENT REPORT: NORMAL
PATH REPORT.FINAL DX SPEC: NORMAL
PATH REPORT.GROSS SPEC: NORMAL
PATH REPORT.RELEVANT HX SPEC: NORMAL
PATH REPORT.TOTAL CANCER: NORMAL

## 2024-07-09 ENCOUNTER — APPOINTMENT (OUTPATIENT)
Dept: SURGERY | Facility: CLINIC | Age: 44
End: 2024-07-09
Payer: COMMERCIAL

## 2024-07-09 VITALS
WEIGHT: 210.4 LBS | OXYGEN SATURATION: 98 % | TEMPERATURE: 98.3 F | RESPIRATION RATE: 16 BRPM | HEIGHT: 68 IN | DIASTOLIC BLOOD PRESSURE: 95 MMHG | SYSTOLIC BLOOD PRESSURE: 146 MMHG | BODY MASS INDEX: 31.89 KG/M2 | HEART RATE: 65 BPM

## 2024-07-09 DIAGNOSIS — L91.8 FIBROEPITHELIAL POLYP: Primary | ICD-10-CM

## 2024-07-09 PROCEDURE — 3077F SYST BP >= 140 MM HG: CPT | Performed by: SURGERY

## 2024-07-09 PROCEDURE — 99024 POSTOP FOLLOW-UP VISIT: CPT | Performed by: SURGERY

## 2024-07-09 PROCEDURE — 3080F DIAST BP >= 90 MM HG: CPT | Performed by: SURGERY

## 2024-07-09 PROCEDURE — 1036F TOBACCO NON-USER: CPT | Performed by: SURGERY

## 2024-07-09 ASSESSMENT — PAIN SCALES - GENERAL: PAINLEVEL: 0-NO PAIN

## 2024-07-30 ENCOUNTER — APPOINTMENT (OUTPATIENT)
Dept: SURGERY | Facility: CLINIC | Age: 44
End: 2024-07-30
Payer: COMMERCIAL

## 2024-07-30 VITALS
DIASTOLIC BLOOD PRESSURE: 87 MMHG | OXYGEN SATURATION: 99 % | SYSTOLIC BLOOD PRESSURE: 134 MMHG | WEIGHT: 207 LBS | TEMPERATURE: 97.8 F | RESPIRATION RATE: 18 BRPM | BODY MASS INDEX: 31.37 KG/M2 | HEART RATE: 72 BPM | HEIGHT: 68 IN

## 2024-07-30 DIAGNOSIS — L91.8 FIBROEPITHELIAL POLYP: Primary | ICD-10-CM

## 2024-07-30 PROCEDURE — 3008F BODY MASS INDEX DOCD: CPT | Performed by: SURGERY

## 2024-07-30 PROCEDURE — 3075F SYST BP GE 130 - 139MM HG: CPT | Performed by: SURGERY

## 2024-07-30 PROCEDURE — 3079F DIAST BP 80-89 MM HG: CPT | Performed by: SURGERY

## 2024-07-30 PROCEDURE — 99024 POSTOP FOLLOW-UP VISIT: CPT | Performed by: SURGERY

## 2024-07-30 PROCEDURE — 1036F TOBACCO NON-USER: CPT | Performed by: SURGERY

## 2024-07-30 NOTE — PROGRESS NOTES
Second Post-Operative Office Visit    Patient feels well.  About a week after his first postoperative visit, his symptoms resolved.  He had no further drainage thereafter, pain swelling or other issues.  He feels back to normal essentially.    7/9/24:    Seamus Damon presents for his postoperative visit.  On 6/28/2024 the patient underwent the following procedure:     Rectal exam under anesthesia     Diagnostic anoscopy     Excision of anal polyp        Pathology revealed:     FINAL DIAGNOSIS   A. COLON, RECTUM /RIGHT ANTERIOR ANAL POLYP, ANOSCOPY/ANAL POLYP EXCISION:     --  FIBROEPITHELIAL POLYP.       His postoperative course has been unremarkable.  He took only Tylenol and ibuprofen on a regular basis.  He used no Norco.  He has noted some bloody drainage along the buttock crease.  This is not stained his underwear and he has not used a pad.  Bowel movements have been normal.     Vitals  There were no vitals taken for this visit.      Exam     Ano-Rectal Physical Exam     General  General Appearance - Not in acute distress.  Well-developed and well-nourished.  Alert and oriented time 3.     Sindy, my medical assistant, chaperoned and assisted  Patient examined in the prone jackknife position on the proctoscopy table.     Anorectal Exam:      External - the right anterior radial incision is completely healed and epithelialized    Internal -CRISSY is normal with normal sphincter tone, no masses or blood              Assessment and Plan     Mr. Damon continues to do very well postoperatively.    The anal incision is completely healed.     Recommendations:     No activity restrictions    Follow-up as needed    Practice good colon health:    a.  Avoid constipation and straining with bowel movements  b.  Stay well-hydrated - drink at least six, 8 ounce glasses of fluid daily  c.  High fiber diet  d.  Add supplemental fiber to your diet daily;  may use granulated formula such as Metamucil or Citrucel daily;  I  recommend Fiber Well gummies 2 daily which will provide 5 grams of supplemental fiber daily (these are easy to use, and can be purchased over-the-counter at [employer], or other pharmacies)

## 2024-10-23 ENCOUNTER — APPOINTMENT (OUTPATIENT)
Dept: PRIMARY CARE | Facility: CLINIC | Age: 44
End: 2024-10-23
Payer: COMMERCIAL

## 2024-10-23 VITALS
WEIGHT: 206.8 LBS | TEMPERATURE: 98.2 F | SYSTOLIC BLOOD PRESSURE: 127 MMHG | OXYGEN SATURATION: 95 % | HEART RATE: 65 BPM | DIASTOLIC BLOOD PRESSURE: 83 MMHG | BODY MASS INDEX: 31.44 KG/M2

## 2024-10-23 DIAGNOSIS — E66.811 CLASS 1 OBESITY WITH SERIOUS COMORBIDITY AND BODY MASS INDEX (BMI) OF 31.0 TO 31.9 IN ADULT, UNSPECIFIED OBESITY TYPE: ICD-10-CM

## 2024-10-23 DIAGNOSIS — I10 HYPERTENSION, UNSPECIFIED TYPE: ICD-10-CM

## 2024-10-23 DIAGNOSIS — Z78.9 INTOLERANCE OF CONTINUOUS POSITIVE AIRWAY PRESSURE (CPAP) VENTILATION: ICD-10-CM

## 2024-10-23 DIAGNOSIS — E78.5 HYPERLIPIDEMIA, UNSPECIFIED HYPERLIPIDEMIA TYPE: ICD-10-CM

## 2024-10-23 DIAGNOSIS — F41.8 DEPRESSION WITH ANXIETY: Primary | ICD-10-CM

## 2024-10-23 DIAGNOSIS — E03.9 HYPOTHYROIDISM, UNSPECIFIED TYPE: ICD-10-CM

## 2024-10-23 DIAGNOSIS — G47.33 OSA (OBSTRUCTIVE SLEEP APNEA): ICD-10-CM

## 2024-10-23 PROBLEM — E78.6 ELEVATED RATIO OF CHOLESTEROL TO HIGH DENSITY LIPOPROTEIN (HDL): Status: ACTIVE | Noted: 2024-10-23

## 2024-10-23 PROCEDURE — 99214 OFFICE O/P EST MOD 30 MIN: CPT | Performed by: FAMILY MEDICINE

## 2024-10-23 PROCEDURE — 3074F SYST BP LT 130 MM HG: CPT | Performed by: FAMILY MEDICINE

## 2024-10-23 PROCEDURE — 1036F TOBACCO NON-USER: CPT | Performed by: FAMILY MEDICINE

## 2024-10-23 PROCEDURE — 3079F DIAST BP 80-89 MM HG: CPT | Performed by: FAMILY MEDICINE

## 2024-10-23 ASSESSMENT — PATIENT HEALTH QUESTIONNAIRE - PHQ9
9. THOUGHTS THAT YOU WOULD BE BETTER OFF DEAD, OR OF HURTING YOURSELF: NOT AT ALL
SUM OF ALL RESPONSES TO PHQ QUESTIONS 1-9: 9
6. FEELING BAD ABOUT YOURSELF - OR THAT YOU ARE A FAILURE OR HAVE LET YOURSELF OR YOUR FAMILY DOWN: SEVERAL DAYS
4. FEELING TIRED OR HAVING LITTLE ENERGY: MORE THAN HALF THE DAYS
5. POOR APPETITE OR OVEREATING: MORE THAN HALF THE DAYS
3. TROUBLE FALLING OR STAYING ASLEEP OR SLEEPING TOO MUCH: NEARLY EVERY DAY
1. LITTLE INTEREST OR PLEASURE IN DOING THINGS: NOT AT ALL
SUM OF ALL RESPONSES TO PHQ9 QUESTIONS 1 AND 2: 1
2. FEELING DOWN, DEPRESSED OR HOPELESS: SEVERAL DAYS
7. TROUBLE CONCENTRATING ON THINGS, SUCH AS READING THE NEWSPAPER OR WATCHING TELEVISION: NOT AT ALL
8. MOVING OR SPEAKING SO SLOWLY THAT OTHER PEOPLE COULD HAVE NOTICED. OR THE OPPOSITE, BEING SO FIGETY OR RESTLESS THAT YOU HAVE BEEN MOVING AROUND A LOT MORE THAN USUAL: NOT AT ALL

## 2024-10-23 ASSESSMENT — ANXIETY QUESTIONNAIRES
GAD7 TOTAL SCORE: 10
3. WORRYING TOO MUCH ABOUT DIFFERENT THINGS: MORE THAN HALF THE DAYS
7. FEELING AFRAID AS IF SOMETHING AWFUL MIGHT HAPPEN: SEVERAL DAYS
6. BECOMING EASILY ANNOYED OR IRRITABLE: MORE THAN HALF THE DAYS
1. FEELING NERVOUS, ANXIOUS, OR ON EDGE: MORE THAN HALF THE DAYS
4. TROUBLE RELAXING: SEVERAL DAYS
5. BEING SO RESTLESS THAT IT IS HARD TO SIT STILL: NOT AT ALL
2. NOT BEING ABLE TO STOP OR CONTROL WORRYING: MORE THAN HALF THE DAYS

## 2024-10-23 NOTE — ASSESSMENT & PLAN NOTE
Medication assisted weight loss discussion held today, including GLP-1 agents.  Side effects include GI upset, nausea, diarrhea, constipation.  No prior issues with bowel habits or reflux.  No personal history of pancreatitis  No first degree relative with hx of thyroid cancer (uncle with thyroid cancer s/p resection, unsure of type).    Patient with elevated BMI in 31 range and comorbidities of HTN, DM, and HLD, I do feel he is good candidate for GLP-1. Unable to tolerate CPAP treatment for the DM. Weight reduction also recommended for treatment for sleep apnea would benefit from weight loss medication. Prescription for Zepbound sent to pharmacy today.     He is aware this will likely require PA and is ultimately up to insurance of they cover for indicated diagnoses.  Referral to clinical pharmacy placed today as well for medication assisted weight loss med management.  If approved they will aid in titration of the GLP-1.    Diet and exercise recommendations revisited. Lifestyle recommendations for overall wellness, as you are interested or able:     We recommend limitation of refined carbohydrates such as pasta, pretzels, chips, breads, bagels or cereals- particularly white flour containing.   Limit sugar sweetened foods or beverages, alcohol, pastries, candy, sports drinks or sodas.  (Water is best.)  Minimize diet drinks with artificial sugars.  If craving a sweetened food or beverage, two sweeteners from natural sources without substantial calories or glucose-raising properties are monk fruit extract or stevia.     Shoot for drinking 64 oz water daily unless conditions such as heart failure limit your recommended intake (Ask your doctor if you're not sure.)    Whole grain foods can be part of a healthy diet, and include such things as steel cut oats, brown rice, quinoa, millet, Jose M or other sprouted breads (often found in the freezer section), amaranth, teff, farrow., etc.  Wheat/buckwheat/spelt can be  added if you are not gluten-sensitive, and if these foods don't cause you bloating or symptoms of inflammation.       We do recommend eating lots of vegetables- 5 -7 servings of veggies daily, which is good fiber source as well as vitamins and minerals. (Think egg bake with spinach, peppers, onions, for breakfast,  celery with nut butter, or hummus and cucumbers as snacks,  salads with lunch and/or dinner, 1 - 2 veggies with lunch and or dinner,  etc)    Eat lean proteins -shoot for 70 - 100 g of protein per day unless you have restrictions from kidney disease, etc.  Think hard boiled eggs, beans, seeds/lentils, cottage cheese, consider protein powder such as whey or pea powder in steel cut oats or a homemade smoothie.     Eat some healthy fat daily, such as a handful of almonds, walnuts, pumpkin seeds, a serving of salmon, a splash of olive oil on your salad, an avocado.       Healthy nutritional choices decrease conditions like elevated cholesterol, elevated sugars, elevated weight, elevated blood pressure, and elevated inflammation.    Healthy choices can also lower risk of events such as strokes, heart attacks, and cancer.     Make most of your food intake plant- based.   Have occasional treats if you like, but try not to overindulge.     Some sort of heart-rate increasing movement or exercise is recommended 4 - 6 days per week, even if in 5 or 10 min increments several times throughout the day.     Include within that exercise time some strength/ resistance exercises at least 3 days per week.  This can be through use of resistance bands, free weights, machines, body weight lifting, heavy outdoor chores such as yardwork, mulching, chopping wood, etc.   Stretching/range of motion exercises should also be included as part of your exercise time - such as yoga, conor chi, or even just post- exercise stretching.  Moving exercised muscles beyond day to day usual activities can help you stay limber and flexible, decrease  injury risk, and minimize aches and pains with everyday movements.     Either in combination with exercise or just as a self-care quiet practice, spending time in nature has a wide range of positive effects on your health and wellbeing, including improved mood, improved blood pressure, and improved immune function.   Even 10 - 20 minutes a few days per week can make a difference.     Consider visiting [email or web address]     This site is a new partnership between [facility] and local physicians to include a prescription for nature as part of your self-care and wellness.      Healthy sleep ( 6 - 8 hours if possible) and  involvement in community (neighbors, family, senior center, hobby groups, marivel based realationships, etc) are also important parts of overall well being.      Pick one or two things to focus on per week or month and start building on your wellness promoting activities.   You deserve it!

## 2024-10-23 NOTE — ASSESSMENT & PLAN NOTE
Patient reports had sleep study years ago and tried CPAP therapy x 2 years but was ultimately unable to tolerate CPAP. Would be open to consult regarding dental apparatus for the sleep apnea. Patient also plans to work on weight reduction as weight loss can help improve sleep apnea. Diet and exercise recommendations revisited. Medication assisted weight loss discussion held today, patient with elevated BMI in 31 range and comorbidities of HTN, DM, and HLD, rx for Zepbound sent to pharmacy today. He is aware this will likely require PA and is ultimately up to insurance of they cover.

## 2024-10-23 NOTE — PROGRESS NOTES
Subjective   Patient ID: Seamus Damon is a 44 y.o. male who presents for Follow-up (Pt presents for 6 month follow up scales- pt states no concerns/questions at this time. ).    HPI     Patient presents today for 6 month follow-up.    Patient concerns:  Patient is doing well overall.     S/p resection of anal polyp via Dr. Hernandez  No further issues.    Would like to discuss medication assisted weight loss.  No hx of reflux or GI   No hx of pancreatitis  Uncle with hx of thyroid - unsure of type but no first degree relative.    ROUTINE VISIT  CHRONIC CONDITIONS:   Mood  Hx of depression and anxiety  Patient thinks may be some possible mild OCD  FMHx of anxiety/depression on mom's side, cousin with severe OCD    Current regimen:  Sertraline 200 mg daily    PHQ-9:     9  MARYJANE-7:   10    Wife currently experiencing some medical issues, does have him concerned but because this is making his wife anxious.    Admits could exercise more and increase veggies but tries to eat overall healthy, lots of fiber.  When does exercise will do some lifting or things outdoors.    Patient is taking and tolerating the medications as prescribed.   Patient denies suicidal ideation or homicidal ideation.   Patient denies any symptoms of raoul such as pressured speech, impulsive purchases.  Patient reports good control on the current medication.  Patient is satisfied with their current regimen and wishes to continue.     Hypothyroidism  Currently on Synthroid therapy via PCP    Last TSH in 4/2024 was normal  Current dose: Synthroid 125 mcg daily.    Medication is being taken as directed and is well-tolerated.   Patient denies heat or cold intolerance, diarrhea or constipation, coarsening of hair, and palpitations.     Hypertension  Presently on Lisinopril-HCTZ 10-12.5 mg daily    Medications are being taken and tolerated well. No side effects are reported.  BP pressures under good control outside of office as far as he knows.  Patient denies  chest pain, shortness of breath with exertion, palpitations, lower extremity edema, headache, or dizziness.     Hyperlipidemia  Lifestyle managed, not presently on statin or aspirin therapy.    04/2024 TC/HDL ratio 4.5, HDL 47, , TRIG 135  02/2023 TC/HDL ratio 5.1, HDL 47, , TRIG 289  11/2021 TC/HDL ratio 4.5, HDL 42, LDL 89, TRIG 294    DM  Had sleep study a while ago  Tried CPAP for 2 years, hated it, did not tolerate    Review of Systems   All other systems reviewed and are negative.      Objective   /83 (BP Location: Right arm, Patient Position: Sitting, BP Cuff Size: Adult)   Pulse 65   Temp 36.8 °C (98.2 °F) (Temporal)   Wt 93.8 kg (206 lb 12.8 oz)   SpO2 95%   BMI 31.44 kg/m²     Physical Exam  Vitals and nursing note reviewed.   Constitutional:       General: He is not in acute distress.     Appearance: Normal appearance. He is not toxic-appearing.   HENT:      Head: Normocephalic and atraumatic.   Neck:      Thyroid: No thyromegaly.   Cardiovascular:      Rate and Rhythm: Normal rate and regular rhythm.      Heart sounds: No murmur heard.     No friction rub. No gallop.   Pulmonary:      Effort: Pulmonary effort is normal.      Breath sounds: Normal breath sounds. No wheezing, rhonchi or rales.   Musculoskeletal:      Right lower leg: Edema (trace) present.      Left lower leg: Edema (trace) present.   Lymphadenopathy:      Cervical: No cervical adenopathy.   Skin:     General: Skin is warm and dry.   Neurological:      General: No focal deficit present.      Mental Status: He is alert and oriented to person, place, and time.      Gait: Gait normal.   Psychiatric:         Mood and Affect: Mood normal.         Behavior: Behavior normal.         Assessment/Plan   Problem List Items Addressed This Visit             ICD-10-CM    Depression with anxiety - Primary F41.8     Scales and scores have been reviewed and discussed, see results section.  Mood is stable and well controlled on  current regimen, continue Sertraline 200 mg daily.         Hyperlipidemia E78.5     04/2024 TC/HDL ratio 4.5, HDL 47, , TRIG 135  02/2023 TC/HDL ratio 5.1, HDL 47, , TRIG 289  11/2021 TC/HDL ratio 4.5, HDL 42, LDL 89, TRIG 294  Goal TC/HDL ratio 3.4 or less, LDL 99 or less,  or less, and TRIG 150 or less.     Lifestyle managed, not presently on statin or aspirin therapy.  Diet and exercise recommendations revisited.     To lower this with lifestyle measures:  -make sure you are avoiding refined carbs such as breads, pasta, cereal, candy, soda,  nutrition bars, granola, chips, and sugar sweetened beverages.      -eat 5- 7 servings daily of veggies,  healthy protein such as chicken, fish,  beans, and eggs, and include healthy fats in your diet such as seeds, nuts, olive oil, avocados, and salmon.   -exercise 4 - 6 days per week as you are able, 150 minutes total weekly divided up is recommended with 3-4 of those days including resistance/strength training.  -consider taking the fiber product psyllium capsules or powder 2 times daily (generic brand is fine) , or a brand name psyllium such as [facility] or Metamucil.  -consider also adding plant stanols and sterols such as Nature Made CholestOff, available over the counter.          Relevant Medications    tirzepatide, weight loss, (Zepbound) 2.5 mg/0.5 mL injection    Other Relevant Orders    Referral to Clinical Pharmacy    Hypertension I10     BP is 127/83 in office today, goal BP is 130/80 or less, ideally 120/80 or less.   BP is adequately controlled, continue the Lisinopril-HCTZ 10-12.5 mg daily.  Diet and exercise recommendations revisited.  Labs are utd, 4/2024, to be done annually for medication monitoring, or sooner if indicated otherwise.         Relevant Medications    tirzepatide, weight loss, (Zepbound) 2.5 mg/0.5 mL injection    Other Relevant Orders    Referral to Clinical Pharmacy    Hypothyroidism E03.9     Last TSH in 4/2024  was normal  To be checked annually when under good control and asymptomatic.  Continue present regimen as prescribed.    Current dose: Synthroid 125 mcg daily.         DM (obstructive sleep apnea) G47.33     Patient reports had sleep study years ago and tried CPAP therapy x 2 years but was ultimately unable to tolerate CPAP. Would be open to consult regarding dental apparatus for the sleep apnea. Patient also plans to work on weight reduction as weight loss can help improve sleep apnea. Diet and exercise recommendations revisited. Medication assisted weight loss discussion held today, patient with elevated BMI in 31 range and comorbidities of HTN, DM, and HLD, rx for Zepbound sent to pharmacy today. He is aware this will likely require PA and is ultimately up to insurance of they cover.         Relevant Medications    tirzepatide, weight loss, (Zepbound) 2.5 mg/0.5 mL injection    Other Relevant Orders    Referral to Clinical Pharmacy    Class 1 obesity with serious comorbidity and body mass index (BMI) of 31.0 to 31.9 in adult E66.811, Z68.31     Medication assisted weight loss discussion held today, including GLP-1 agents.  Side effects include GI upset, nausea, diarrhea, constipation.  No prior issues with bowel habits or reflux.  No personal history of pancreatitis  No first degree relative with hx of thyroid cancer (uncle with thyroid cancer s/p resection, unsure of type).    Patient with elevated BMI in 31 range and comorbidities of HTN, DM, and HLD, I do feel he is good candidate for GLP-1. Unable to tolerate CPAP treatment for the DM. Weight reduction also recommended for treatment for sleep apnea would benefit from weight loss medication. Prescription for Zepbound sent to pharmacy today.     He is aware this will likely require PA and is ultimately up to insurance of they cover for indicated diagnoses.  Referral to clinical pharmacy placed today as well for medication assisted weight loss med  management.  If approved they will aid in titration of the GLP-1.    Diet and exercise recommendations revisited. Lifestyle recommendations for overall wellness, as you are interested or able:     We recommend limitation of refined carbohydrates such as pasta, pretzels, chips, breads, bagels or cereals- particularly white flour containing.   Limit sugar sweetened foods or beverages, alcohol, pastries, candy, sports drinks or sodas.  (Water is best.)  Minimize diet drinks with artificial sugars.  If craving a sweetened food or beverage, two sweeteners from natural sources without substantial calories or glucose-raising properties are monk fruit extract or stevia.     Shoot for drinking 64 oz water daily unless conditions such as heart failure limit your recommended intake (Ask your doctor if you're not sure.)    Whole grain foods can be part of a healthy diet, and include such things as steel cut oats, brown rice, quinoa, millet, Jose M or other sprouted breads (often found in the freezer section), amaranth, teff, farrow., etc.  Wheat/buckwheat/spelt can be added if you are not gluten-sensitive, and if these foods don't cause you bloating or symptoms of inflammation.       We do recommend eating lots of vegetables- 5 -7 servings of veggies daily, which is good fiber source as well as vitamins and minerals. (Think egg bake with spinach, peppers, onions, for breakfast,  celery with nut butter, or hummus and cucumbers as snacks,  salads with lunch and/or dinner, 1 - 2 veggies with lunch and or dinner,  etc)    Eat lean proteins -shoot for 70 - 100 g of protein per day unless you have restrictions from kidney disease, etc.  Think hard boiled eggs, beans, seeds/lentils, cottage cheese, consider protein powder such as whey or pea powder in steel cut oats or a homemade smoothie.     Eat some healthy fat daily, such as a handful of almonds, walnuts, pumpkin seeds, a serving of salmon, a splash of olive oil on your salad, an  avocado.       Healthy nutritional choices decrease conditions like elevated cholesterol, elevated sugars, elevated weight, elevated blood pressure, and elevated inflammation.    Healthy choices can also lower risk of events such as strokes, heart attacks, and cancer.     Make most of your food intake plant- based.   Have occasional treats if you like, but try not to overindulge.     Some sort of heart-rate increasing movement or exercise is recommended 4 - 6 days per week, even if in 5 or 10 min increments several times throughout the day.     Include within that exercise time some strength/ resistance exercises at least 3 days per week.  This can be through use of resistance bands, free weights, machines, body weight lifting, heavy outdoor chores such as yardwork, mulching, chopping wood, etc.   Stretching/range of motion exercises should also be included as part of your exercise time - such as yoga, conor chi, or even just post- exercise stretching.  Moving exercised muscles beyond day to day usual activities can help you stay limber and flexible, decrease injury risk, and minimize aches and pains with everyday movements.     Either in combination with exercise or just as a self-care quiet practice, spending time in nature has a wide range of positive effects on your health and wellbeing, including improved mood, improved blood pressure, and improved immune function.   Even 10 - 20 minutes a few days per week can make a difference.     Consider visiting [email or web address]     This site is a new partnership between [facility] and local physicians to include a prescription for nature as part of your self-care and wellness.      Healthy sleep ( 6 - 8 hours if possible) and  involvement in community (neighbors, family, senior center, hobby groups, marivel based realationships, etc) are also important parts of overall well being.      Pick one or two things to focus on per week or month and start  building on your wellness promoting activities.   You deserve it!           Relevant Medications    tirzepatide, weight loss, (Zepbound) 2.5 mg/0.5 mL injection    Other Relevant Orders    Referral to Clinical Pharmacy    Intolerance of continuous positive airway pressure (CPAP) ventilation Z78.9    Relevant Orders    Referral to Clinical Pharmacy       Follow-up in 6 months for routine care.  Scales upon rooming.   Call for sooner follow-up if needed.         Scribe Attestation  By signing my name below, I, Purnima Serrano   attest that this documentation has been prepared under the direction and in the presence of Clair Cobian DO.

## 2024-10-23 NOTE — ASSESSMENT & PLAN NOTE
BP is 127/83 in office today, goal BP is 130/80 or less, ideally 120/80 or less.   BP is adequately controlled, continue the Lisinopril-HCTZ 10-12.5 mg daily.  Diet and exercise recommendations revisited.  Labs are utd, 4/2024, to be done annually for medication monitoring, or sooner if indicated otherwise.

## 2024-10-23 NOTE — ASSESSMENT & PLAN NOTE
Scales and scores have been reviewed and discussed, see results section.  Mood is stable and well controlled on current regimen, continue Sertraline 200 mg daily.

## 2024-10-23 NOTE — PATIENT INSTRUCTIONS
Hyperlipidemia  04/2024 TC/HDL ratio 4.5, HDL 47, , TRIG 135  02/2023 TC/HDL ratio 5.1, HDL 47, , TRIG 289  11/2021 TC/HDL ratio 4.5, HDL 42, LDL 89, TRIG 294  Goal TC/HDL ratio 3.4 or less, LDL 99 or less,  or less, and TRIG 150 or less.     Lifestyle managed, not presently on statin or aspirin therapy.  Diet and exercise recommendations revisited.     To lower this with lifestyle measures:  -make sure you are avoiding refined carbs such as breads, pasta, cereal, candy, soda,  nutrition bars, granola, chips, and sugar sweetened beverages.      -eat 5- 7 servings daily of veggies,  healthy protein such as chicken, fish,  beans, and eggs, and include healthy fats in your diet such as seeds, nuts, olive oil, avocados, and salmon.   -exercise 4 - 6 days per week as you are able, 150 minutes total weekly divided up is recommended with 3-4 of those days including resistance/strength training.  -consider taking the fiber product psyllium capsules or powder 2 times daily (generic brand is fine) , or a brand name psyllium such as [facility] or Metamucil.  -consider also adding plant stanols and sterols such as Nature Made CholestOff, available over the counter.     Depression with anxiety  Mood is stable and well controlled on current regimen, continue Sertraline 200 mg daily.    Hypothyroidism  Last TSH in 4/2024 was normal  To be checked annually when under good control and asymptomatic.  Continue present regimen as prescribed.    Current dose: Synthroid 125 mcg daily.    DM (obstructive sleep apnea)  Patient reports had sleep study years ago and tried CPAP therapy x 2 years but was ultimately unable to tolerate CPAP. Would be open to consult regarding dental apparatus for the sleep apnea. Patient also plans to work on weight reduction as weight loss can help improve sleep apnea. Diet and exercise recommendations revisited. Medication assisted weight loss discussion held today, patient with  elevated BMI in 31 range and comorbidities of HTN, DM, and HLD, rx for Zepbound sent to pharmacy today. He is aware this will likely require PA and is ultimately up to insurance of they cover.    Hypertension  BP is 127/83 in office today, goal BP is 130/80 or less, ideally 120/80 or less.   BP is adequately controlled, continue the Lisinopril-HCTZ 10-12.5 mg daily.  Diet and exercise recommendations revisited.  Labs are utd, 4/2024, to be done annually for medication monitoring, or sooner if indicated otherwise.    Zepbound sent in to pharmacy - clinical pharmacy will call you to set up an appointment to help secure medication for weight loss        Follow up in 6 mo for weight check on labs   (please have them done about a week before your next visit)     You might cnsider looking into [facility].      This is a local virtual/ telehealth clinic that partners with  compounding pharmacies to prescribe generic versions of medications such as Ozempic, Mounjaro, Androgel, etc.    They manage medication assisted weight loss and also male testosterone deficiency.       Their email contact is [email or web address]    The phone number is [phone]      Non-CPAP sleep apnea treatment -     A custom oral  appliance can be an effective alternative to CPAP for treatment of sleep apnea .    These devices are custom fitted by dentists with special expertise and training in treating sleep apnea. They fit in the mouth, similar to a retainer or mouth guard. They open your airway by moving your lower jaw and tongue forward while you sleep. They are small, comfortable and effective.    One such provider in our area is Dr. Monik Maya,  BDS, DMD, Diplomate, ABDSM.    She practices at [facility].    She is taking new patients and can set up a consultation to discuss further.     Phone [phone]  Fax: [phone] [address] , [place] [address]  Office Hours  Monday-Thursday:  9:00am-5:00pm    Follow up in 6 mo

## 2024-10-23 NOTE — ASSESSMENT & PLAN NOTE
Last TSH in 4/2024 was normal  To be checked annually when under good control and asymptomatic.  Continue present regimen as prescribed.    Current dose: Synthroid 125 mcg daily.

## 2024-10-23 NOTE — ASSESSMENT & PLAN NOTE
04/2024 TC/HDL ratio 4.5, HDL 47, , TRIG 135  02/2023 TC/HDL ratio 5.1, HDL 47, , TRIG 289  11/2021 TC/HDL ratio 4.5, HDL 42, LDL 89, TRIG 294  Goal TC/HDL ratio 3.4 or less, LDL 99 or less,  or less, and TRIG 150 or less.     Lifestyle managed, not presently on statin or aspirin therapy.  Diet and exercise recommendations revisited.     To lower this with lifestyle measures:  -make sure you are avoiding refined carbs such as breads, pasta, cereal, candy, soda,  nutrition bars, granola, chips, and sugar sweetened beverages.      -eat 5- 7 servings daily of veggies,  healthy protein such as chicken, fish,  beans, and eggs, and include healthy fats in your diet such as seeds, nuts, olive oil, avocados, and salmon.   -exercise 4 - 6 days per week as you are able, 150 minutes total weekly divided up is recommended with 3-4 of those days including resistance/strength training.  -consider taking the fiber product psyllium capsules or powder 2 times daily (generic brand is fine) , or a brand name psyllium such as [facility] or Metamucil.  -consider also adding plant stanols and sterols such as Nature Made CholestOff, available over the counter.

## 2024-10-26 ENCOUNTER — OFFICE VISIT (OUTPATIENT)
Dept: URGENT CARE | Age: 44
End: 2024-10-26
Payer: COMMERCIAL

## 2024-10-26 VITALS
HEART RATE: 60 BPM | WEIGHT: 207 LBS | BODY MASS INDEX: 31.37 KG/M2 | HEIGHT: 68 IN | OXYGEN SATURATION: 99 % | TEMPERATURE: 98.9 F | SYSTOLIC BLOOD PRESSURE: 143 MMHG | RESPIRATION RATE: 18 BRPM | DIASTOLIC BLOOD PRESSURE: 91 MMHG
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
## 2024-10-26 DIAGNOSIS — S20.369A TICK BITE OF CHEST WALL, INITIAL ENCOUNTER: Primary | ICD-10-CM

## 2024-10-26 RX ORDER — DOXYCYCLINE 100 MG/1
200 CAPSULE ORAL DAILY
Qty: 2 CAPSULE | Refills: 0 | Status: SHIPPED | OUTPATIENT
Start: 2024-10-26 | End: 2024-10-27

## 2024-10-26 ASSESSMENT — PAIN SCALES - GENERAL: PAINLEVEL_OUTOF10: 10-WORST PAIN EVER

## 2024-10-26 NOTE — PROGRESS NOTES
"Subjective   Patient ID: Seamus Damon is a 44 y.o. male. They present today with a chief complaint of Insect Bite.    History of Present Illness  HPI  Patient presents after removing a tick from his left upper chest last night.  He states that it was first noted the day before.  He denies any fevers or chills.  No body aches or joint pains.  No redness or swelling over the bite area.  Tick was removed intact and without difficulty.  Past Medical History  Allergies as of 10/26/2024 - Reviewed 10/26/2024   Allergen Reaction Noted    Feathers Other 10/25/2019    Prednisone Rash 03/17/2023       (Not in a hospital admission)       Past Medical History:   Diagnosis Date    Personal history of other diseases of the circulatory system     History of hypertension    Personal history of other diseases of the nervous system and sense organs     History of sleep apnea    Personal history of other endocrine, nutritional and metabolic disease     History of obesity    Personal history of other endocrine, nutritional and metabolic disease     History of hypothyroidism    Personal history of other specified conditions     History of fatigue       No past surgical history on file.     reports that he has never smoked. He has never used smokeless tobacco. He reports that he does not currently use alcohol. He reports that he does not use drugs.    Review of Systems  Review of Systems as in history of present illness                               Objective    Vitals:    10/26/24 0809   BP: (!) 143/91   Pulse: 60   Resp: 18   Temp: 37.2 °C (98.9 °F)   SpO2: 99%   Weight: 93.9 kg (207 lb)   Height: 1.727 m (5' 8\")     No LMP for male patient.    Physical Exam  General: Vitals noted, no distress. Afebrile.   Lungs clear to auscultation.    Extremities: No peripheral edema.  No joint pains or swelling  Skin: No rash. No bruising or discoloration. No cuts or abrasions.  A small tiny scab with no surrounding erythema or swelling is noted " on left upper chest.  No foreign body noted  Neuro: No focal neurologic deficits, NIH score of 0.    Procedures    Point of Care Test & Imaging Results from this visit  No results found for this visit on 10/26/24.   No results found.    Diagnostic study results (if any) were reviewed by Bar Gomez MD.    Assessment/Plan   Allergies, medications, history, and pertinent labs/EKGs/Imaging reviewed by Bar Gomez MD.     Medical Decision Making  At time of discharge patient was clinically well-appearing and HDS for outpatient management. The patient and/or family was educated regarding diagnosis, supportive care, OTC and Rx medications. The patient and/or family was given the opportunity to ask questions prior to discharge.  They verbalized understanding of my discussion of the plans for treatment, expected course, indications to return to  or seek further evaluation in ED, and the need for timely follow up as directed.   They were provided with a work/school excuse if requested.    Orders and Diagnoses  Diagnoses and all orders for this visit:  Tick bite of chest wall, initial encounter  -     doxycycline (Vibramycin) 100 mg capsule; Take 2 capsules (200 mg) by mouth once daily for 1 day. Take with at least 8 ounces (large glass) of water, do not lie down for 30 minutes after      Medical Admin Record      Patient disposition: Home    Electronically signed by Bar Gomez MD  8:26 AM

## 2024-10-26 NOTE — PATIENT INSTRUCTIONS
Take 2 capsules of antibiotic together today with food  Clean area with gentle soap and water  Follow-up for fevers of greater than 101 or flulike symptoms

## 2024-10-31 ENCOUNTER — APPOINTMENT (OUTPATIENT)
Dept: PHARMACY | Facility: HOSPITAL | Age: 44
End: 2024-10-31
Payer: COMMERCIAL

## 2024-10-31 DIAGNOSIS — G47.33 OSA (OBSTRUCTIVE SLEEP APNEA): ICD-10-CM

## 2024-10-31 DIAGNOSIS — E66.811 CLASS 1 OBESITY WITH SERIOUS COMORBIDITY AND BODY MASS INDEX (BMI) OF 31.0 TO 31.9 IN ADULT, UNSPECIFIED OBESITY TYPE: ICD-10-CM

## 2024-10-31 DIAGNOSIS — I10 HYPERTENSION, UNSPECIFIED TYPE: ICD-10-CM

## 2024-10-31 DIAGNOSIS — E78.5 HYPERLIPIDEMIA, UNSPECIFIED HYPERLIPIDEMIA TYPE: ICD-10-CM

## 2024-10-31 DIAGNOSIS — Z78.9 INTOLERANCE OF CONTINUOUS POSITIVE AIRWAY PRESSURE (CPAP) VENTILATION: ICD-10-CM

## 2024-11-04 NOTE — PROGRESS NOTES
Clinical Pharmacy Appointment    Patient ID: Seamus Damon is a 44 y.o. male who presents for Weight Loss.    Pt is here for First appointment.     Referring Provider: Clair Cobian, *  PCP: Clair Cobian DO   Last visit with PCP: 10/23/2024   Next visit with PCP: 4/24/2025      Subjective     Interval History      HPI  Starting Weight: 207 lbs   Current Weight: 207 lbs     -  The 10-year ASCVD risk score (Sana ARANA, et al., 2019) is: 3.9%    Values used to calculate the score:      Age: 44 years      Sex: Male      Is Non- : No      Diabetic: No      Tobacco smoker: No      Systolic Blood Pressure: 143 mmHg      Is BP treated: Yes      HDL Cholesterol: 47.2 mg/dL      Total Cholesterol: 214 mg/dL     Pertinent PMH Review:  - PMH of Pancreatitis: No  - PMH/FH of Medullary Thyroid Cancer: No  - PMH of Retinopathy: No    Drug Interactions  No relevant drug interactions were noted.    Medication System Management  Patient's preferred pharmacy: [facility] in [place]   Adherence/Organization: None  Affordability/Accessibility: None       Objective   Allergies   Allergen Reactions    Feathers Other     Sinus mucous- down feathers    Prednisone Rash     Social History     Social History Narrative    Not on file      Medication Review  Current Outpatient Medications   Medication Instructions    levothyroxine (SYNTHROID, LEVOXYL) 125 mcg, oral, Daily    lisinopriL-hydrochlorothiazide 10-12.5 mg tablet 1 tablet, oral, Daily    sertraline (ZOLOFT) 200 mg, oral, Nightly    tirzepatide (weight loss) (ZEPBOUND) 2.5 mg, subcutaneous, Every 7 days      Vitals  BP Readings from Last 2 Encounters:   10/26/24 (!) 143/91   10/23/24 127/83     BMI Readings from Last 1 Encounters:   10/26/24 31.47 kg/m²      Labs  A1C  Lab Results   Component Value Date    HGBA1C 5.0 05/03/2019     BMP  Lab Results   Component Value Date    CALCIUM 9.9 04/04/2024     04/04/2024    K 4.3 04/04/2024    CO2 31  "04/04/2024     04/04/2024    BUN 17 04/04/2024    CREATININE 0.88 04/04/2024    EGFR >90 04/04/2024     LFTs  Lab Results   Component Value Date    ALT 16 04/04/2024    AST 16 04/04/2024    ALKPHOS 61 04/04/2024    BILITOT 0.5 04/04/2024     FLP  Lab Results   Component Value Date    TRIG 135 04/04/2024    CHOL 214 (H) 04/04/2024    LDLF 137 (H) 02/06/2023    LDLCALC 140 (H) 04/04/2024    HDL 47.2 04/04/2024     Urine Microalbumin  No results found for: \"MICROALBCREA\"  Weight Management  Wt Readings from Last 3 Encounters:   10/26/24 93.9 kg (207 lb)   10/23/24 93.8 kg (206 lb 12.8 oz)   07/30/24 93.9 kg (207 lb)      There is no height or weight on file to calculate BMI.     Assessment/Plan   Problem List Items Addressed This Visit       Hyperlipidemia    Relevant Medications    tirzepatide, weight loss, (Zepbound) 2.5 mg/0.5 mL injection    Hypertension    Relevant Medications    tirzepatide, weight loss, (Zepbound) 2.5 mg/0.5 mL injection    DM (obstructive sleep apnea)    Relevant Medications    tirzepatide, weight loss, (Zepbound) 2.5 mg/0.5 mL injection    Class 1 obesity with serious comorbidity and body mass index (BMI) of 31.0 to 31.9 in adult     Patient was referred for initiation of a GLP-1 agonist for weight loss.  Discussed options available including Wegovy, Saxenda, and Zepbound.  Patient's insurance does cover weight loss injections. The PA for Zepbound was approved and patient was agreeable to start the medication.      Zepbound Education:     - Counseled patient on MOA, expectations, side effects, duration of therapy, contraindications, administration, and monitoring parameters  - Answered all patient questions and concerns  - Counseled patient on Zepbound MOA, expectations, side effects, duration of therapy, administration, and monitoring parameters.  - Provided detailed dosing and administration counseling to ensure proper technique.   - Reviewed Zepbound titration schedule, starting with " 2.5 mg once weekly to a goal of 15 mg once weekly if tolerated  - Counseled patient on the benefits of GLP-1ra glycemic control and weight loss  - Reviewed storage requirements of Zepbound when not in use, and when to administer the medication if a dose is missed.  - Advised patient that they may experience improved satiety after meals and portion sizes of meals may be reduced as doses of Zepbound increase.    PLAN:   - Start Zepbound 2.5 mg once weekly injection.  Prescription sent to patient's preferred pharmacy.             Relevant Medications    tirzepatide, weight loss, (Zepbound) 2.5 mg/0.5 mL injection    Intolerance of continuous positive airway pressure (CPAP) ventilation     Clinical Pharmacist follow-up: 3 weeks, Telehealth visit    Continue all meds under the continuation of care with the referring provider and clinical pharmacy team.    Thank you,  Jeannie Gibson, PharmD   Clinical Pharmacist  [phone]    Verbal consent to manage patient's drug therapy was obtained from the patient. They were informed they may decline to participate or withdraw from participation in pharmacy services at any time.

## 2024-11-04 NOTE — ASSESSMENT & PLAN NOTE
Patient was referred for initiation of a GLP-1 agonist for weight loss.  Discussed options available including Wegovy, Saxenda, and Zepbound.  Patient's insurance does cover weight loss injections. The PA for Zepbound was approved and patient was agreeable to start the medication.      Zepbound Education:     - Counseled patient on MOA, expectations, side effects, duration of therapy, contraindications, administration, and monitoring parameters  - Answered all patient questions and concerns  - Counseled patient on Zepbound MOA, expectations, side effects, duration of therapy, administration, and monitoring parameters.  - Provided detailed dosing and administration counseling to ensure proper technique.   - Reviewed Zepbound titration schedule, starting with 2.5 mg once weekly to a goal of 15 mg once weekly if tolerated  - Counseled patient on the benefits of GLP-1ra glycemic control and weight loss  - Reviewed storage requirements of Zepbound when not in use, and when to administer the medication if a dose is missed.  - Advised patient that they may experience improved satiety after meals and portion sizes of meals may be reduced as doses of Zepbound increase.    PLAN:   - Start Zepbound 2.5 mg once weekly injection.  Prescription sent to patient's preferred pharmacy.

## 2024-11-06 DIAGNOSIS — E66.811 CLASS 1 OBESITY WITH SERIOUS COMORBIDITY AND BODY MASS INDEX (BMI) OF 31.0 TO 31.9 IN ADULT, UNSPECIFIED OBESITY TYPE: Primary | ICD-10-CM

## 2024-11-14 DIAGNOSIS — E66.811 CLASS 1 OBESITY WITH SERIOUS COMORBIDITY AND BODY MASS INDEX (BMI) OF 31.0 TO 31.9 IN ADULT, UNSPECIFIED OBESITY TYPE: ICD-10-CM

## 2024-11-21 ENCOUNTER — APPOINTMENT (OUTPATIENT)
Dept: PHARMACY | Facility: HOSPITAL | Age: 44
End: 2024-11-21
Payer: COMMERCIAL

## 2024-12-05 ENCOUNTER — APPOINTMENT (OUTPATIENT)
Dept: PHARMACY | Facility: HOSPITAL | Age: 44
End: 2024-12-05
Payer: COMMERCIAL

## 2024-12-12 ENCOUNTER — APPOINTMENT (OUTPATIENT)
Dept: PHARMACY | Facility: HOSPITAL | Age: 44
End: 2024-12-12
Payer: COMMERCIAL

## 2024-12-12 DIAGNOSIS — G47.33 OSA (OBSTRUCTIVE SLEEP APNEA): ICD-10-CM

## 2024-12-12 DIAGNOSIS — E66.811 CLASS 1 OBESITY WITH SERIOUS COMORBIDITY AND BODY MASS INDEX (BMI) OF 31.0 TO 31.9 IN ADULT, UNSPECIFIED OBESITY TYPE: ICD-10-CM

## 2024-12-12 DIAGNOSIS — E78.5 HYPERLIPIDEMIA, UNSPECIFIED HYPERLIPIDEMIA TYPE: ICD-10-CM

## 2024-12-12 DIAGNOSIS — I10 HYPERTENSION, UNSPECIFIED TYPE: ICD-10-CM

## 2024-12-12 NOTE — PROGRESS NOTES
Clinical Pharmacy Appointment    Patient ID: Seamus Damon is a 44 y.o. male who presents for Weight Loss.    Pt is here for follow up appointment.     Referring Provider: Clair Cobian, *  PCP: Clair Cobian DO   Last visit with PCP: 10/23/2024   Next visit with PCP: 4/24/2025      Subjective     Interval History  Patient was started on Zepbound 5 mg because it was cheaper through insurance to fill 3 months at a time.      HPI  Starting Weight: 207 lbs   Current Weight: 200 lbs     -  The 10-year ASCVD risk score (Sana ARANA, et al., 2019) is: 3.9%    Values used to calculate the score:      Age: 44 years      Sex: Male      Is Non- : No      Diabetic: No      Tobacco smoker: No      Systolic Blood Pressure: 143 mmHg      Is BP treated: Yes      HDL Cholesterol: 47.2 mg/dL      Total Cholesterol: 214 mg/dL     Pertinent PMH Review:  - PMH of Pancreatitis: No  - PMH/FH of Medullary Thyroid Cancer: No  - PMH of Retinopathy: No    Drug Interactions  No relevant drug interactions were noted.    Medication System Management  Patient's preferred pharmacy: [facility] in [place]   Adherence/Organization: None  Affordability/Accessibility: None       Objective   Allergies   Allergen Reactions    Feathers Other     Sinus mucous- down feathers    Prednisone Rash     Social History     Social History Narrative    Not on file      Medication Review  Current Outpatient Medications   Medication Instructions    levothyroxine (SYNTHROID, LEVOXYL) 125 mcg, oral, Daily    lisinopriL-hydrochlorothiazide 10-12.5 mg tablet 1 tablet, oral, Daily    sertraline (ZOLOFT) 200 mg, oral, Nightly    tirzepatide (weight loss) (ZEPBOUND) 5 mg, subcutaneous, Every 7 days      Vitals  BP Readings from Last 2 Encounters:   10/26/24 (!) 143/91   10/23/24 127/83     BMI Readings from Last 1 Encounters:   10/26/24 31.47 kg/m²      Labs  A1C  Lab Results   Component Value Date    HGBA1C 5.0 05/03/2019     BMP  Lab  "Results   Component Value Date    CALCIUM 9.9 04/04/2024     04/04/2024    K 4.3 04/04/2024    CO2 31 04/04/2024     04/04/2024    BUN 17 04/04/2024    CREATININE 0.88 04/04/2024    EGFR >90 04/04/2024     LFTs  Lab Results   Component Value Date    ALT 16 04/04/2024    AST 16 04/04/2024    ALKPHOS 61 04/04/2024    BILITOT 0.5 04/04/2024     FLP  Lab Results   Component Value Date    TRIG 135 04/04/2024    CHOL 214 (H) 04/04/2024    LDLF 137 (H) 02/06/2023    LDLCALC 140 (H) 04/04/2024    HDL 47.2 04/04/2024     Urine Microalbumin  No results found for: \"MICROALBCREA\"  Weight Management  Wt Readings from Last 3 Encounters:   10/26/24 93.9 kg (207 lb)   10/23/24 93.8 kg (206 lb 12.8 oz)   07/30/24 93.9 kg (207 lb)      There is no height or weight on file to calculate BMI.     Assessment/Plan   Problem List Items Addressed This Visit       Hyperlipidemia    Relevant Orders    Referral to Clinical Pharmacy    Hypertension    Relevant Orders    Referral to Clinical Pharmacy    DM (obstructive sleep apnea)    Relevant Orders    Referral to Clinical Pharmacy    Class 1 obesity with serious comorbidity and body mass index (BMI) of 31.0 to 31.9 in adult     Patient was referred for initiation of a GLP-1 agonist for weight loss.  Since last visit the patient started Zepbound and reports some nausea with the first dose but it has improved with additional doses.  The patient has also noticed a decrease in appetite/ portion control, more energy, and his clothes fitting looser.  The patient has 2 more months of Zepbound 5 mg so will continue current dose.      PLAN:   - Continue Zepbound 5 mg once weekly injection.            Relevant Orders    Referral to Clinical Pharmacy     Clinical Pharmacist follow-up: 6 weeks , Telehealth visit    Continue all meds under the continuation of care with the referring provider and clinical pharmacy team.    Thank you,  Jeannie Gibson, PharmD   Clinical " Pharmacist  [phone]    Verbal consent to manage patient's drug therapy was obtained from the patient. They were informed they may decline to participate or withdraw from participation in pharmacy services at any time.

## 2024-12-12 NOTE — ASSESSMENT & PLAN NOTE
Patient was referred for initiation of a GLP-1 agonist for weight loss.  Since last visit the patient started Zepbound and reports some nausea with the first dose but it has improved with additional doses.  The patient has also noticed a decrease in appetite/ portion control, more energy, and his clothes fitting looser.  The patient has 2 more months of Zepbound 5 mg so will continue current dose.      PLAN:   - Continue Zepbound 5 mg once weekly injection.

## 2025-01-23 ENCOUNTER — APPOINTMENT (OUTPATIENT)
Dept: PHARMACY | Facility: HOSPITAL | Age: 45
End: 2025-01-23
Payer: COMMERCIAL

## 2025-01-23 DIAGNOSIS — I10 HYPERTENSION, UNSPECIFIED TYPE: ICD-10-CM

## 2025-01-23 DIAGNOSIS — E66.811 CLASS 1 OBESITY WITH SERIOUS COMORBIDITY AND BODY MASS INDEX (BMI) OF 31.0 TO 31.9 IN ADULT, UNSPECIFIED OBESITY TYPE: ICD-10-CM

## 2025-01-23 DIAGNOSIS — E78.5 HYPERLIPIDEMIA, UNSPECIFIED HYPERLIPIDEMIA TYPE: ICD-10-CM

## 2025-01-23 DIAGNOSIS — G47.33 OSA (OBSTRUCTIVE SLEEP APNEA): ICD-10-CM

## 2025-01-23 NOTE — PROGRESS NOTES
Clinical Pharmacy Appointment    Patient ID: Seamus Damon is a 45 y.o. male who presents for Weight Loss.    Pt is here for follow up appointment.     Referring Provider: Clair Cobian, *  PCP: Clair Cobian DO   Last visit with PCP: 10/23/2024   Next visit with PCP: 4/24/2025      Subjective     Interval History  Patient was started on Zepbound 5 mg because it was cheaper through insurance to fill 3 months at a time.      HPI  Starting Weight: 207 lbs   Current Weight: 190 lbs     -  The 10-year ASCVD risk score (Sana ARANA, et al., 2019) is: 4.3%    Values used to calculate the score:      Age: 45 years      Sex: Male      Is Non- : No      Diabetic: No      Tobacco smoker: No      Systolic Blood Pressure: 143 mmHg      Is BP treated: Yes      HDL Cholesterol: 47.2 mg/dL      Total Cholesterol: 214 mg/dL     Pertinent PMH Review:  - PMH of Pancreatitis: No  - PMH/FH of Medullary Thyroid Cancer: No  - PMH of Retinopathy: No    Drug Interactions  No relevant drug interactions were noted.    Medication System Management  Patient's preferred pharmacy: [facility] in [place]   Adherence/Organization: None  Affordability/Accessibility: None       Objective   Allergies   Allergen Reactions    Feathers Other     Sinus mucous- down feathers    Prednisone Rash     Social History     Social History Narrative    Not on file      Medication Review  Current Outpatient Medications   Medication Instructions    levothyroxine (SYNTHROID, LEVOXYL) 125 mcg, oral, Daily    lisinopriL-hydrochlorothiazide 10-12.5 mg tablet 1 tablet, oral, Daily    sertraline (ZOLOFT) 200 mg, oral, Nightly    tirzepatide (weight loss) (ZEPBOUND) 5 mg, subcutaneous, Every 7 days      Vitals  BP Readings from Last 2 Encounters:   10/26/24 (!) 143/91   10/23/24 127/83     BMI Readings from Last 1 Encounters:   10/26/24 31.47 kg/m²      Labs  A1C  Lab Results   Component Value Date    HGBA1C 5.0 05/03/2019     BMP  Lab  "Results   Component Value Date    CALCIUM 9.9 04/04/2024     04/04/2024    K 4.3 04/04/2024    CO2 31 04/04/2024     04/04/2024    BUN 17 04/04/2024    CREATININE 0.88 04/04/2024    EGFR >90 04/04/2024     LFTs  Lab Results   Component Value Date    ALT 16 04/04/2024    AST 16 04/04/2024    ALKPHOS 61 04/04/2024    BILITOT 0.5 04/04/2024     FLP  Lab Results   Component Value Date    TRIG 135 04/04/2024    CHOL 214 (H) 04/04/2024    LDLF 137 (H) 02/06/2023    LDLCALC 140 (H) 04/04/2024    HDL 47.2 04/04/2024     Urine Microalbumin  No results found for: \"MICROALBCREA\"  Weight Management  Wt Readings from Last 3 Encounters:   10/26/24 93.9 kg (207 lb)   10/23/24 93.8 kg (206 lb 12.8 oz)   07/30/24 93.9 kg (207 lb)      There is no height or weight on file to calculate BMI.     Assessment/Plan   Problem List Items Addressed This Visit       Hyperlipidemia    Relevant Orders    Referral to Clinical Pharmacy    Hypertension    Relevant Orders    Referral to Clinical Pharmacy    DM (obstructive sleep apnea)    Relevant Orders    Referral to Clinical Pharmacy    Class 1 obesity with serious comorbidity and body mass index (BMI) of 31.0 to 31.9 in adult     Patient was referred for initiation of a GLP-1 agonist for weight loss.  Since last visit the patient continued Zepbound and reports some nausea with the first dose but it has improved with additional doses.  The patient has also noticed a decrease in appetite/ portion control, more energy, and his clothes fitting looser.  The patient has a few more weeks of Zepbound 5 mg so will continue current dose for 1 more month.        PLAN:   - Continue Zepbound 5 mg once weekly injection.          Relevant Medications    tirzepatide, weight loss, (Zepbound) 5 mg/0.5 mL injection    Other Relevant Orders    Referral to Clinical Pharmacy     Clinical Pharmacist follow-up: 6 weeks , Telehealth visit    Continue all meds under the continuation of care with the " referring provider and clinical pharmacy team.    Thank you,  Jeannie Gibson, PharmD   Clinical Pharmacist  [phone]    Verbal consent to manage patient's drug therapy was obtained from the patient. They were informed they may decline to participate or withdraw from participation in pharmacy services at any time.

## 2025-01-23 NOTE — ASSESSMENT & PLAN NOTE
Patient was referred for initiation of a GLP-1 agonist for weight loss.  Since last visit the patient continued Zepbound and reports some nausea with the first dose but it has improved with additional doses.  The patient has also noticed a decrease in appetite/ portion control, more energy, and his clothes fitting looser.  The patient has a few more weeks of Zepbound 5 mg so will continue current dose for 1 more month.        PLAN:   - Continue Zepbound 5 mg once weekly injection.

## 2025-03-06 ENCOUNTER — APPOINTMENT (OUTPATIENT)
Dept: PHARMACY | Facility: HOSPITAL | Age: 45
End: 2025-03-06
Payer: COMMERCIAL

## 2025-03-06 DIAGNOSIS — I10 HYPERTENSION, UNSPECIFIED TYPE: ICD-10-CM

## 2025-03-06 DIAGNOSIS — E78.5 HYPERLIPIDEMIA, UNSPECIFIED HYPERLIPIDEMIA TYPE: ICD-10-CM

## 2025-03-06 DIAGNOSIS — G47.33 OSA (OBSTRUCTIVE SLEEP APNEA): ICD-10-CM

## 2025-03-06 DIAGNOSIS — E66.811 CLASS 1 OBESITY WITH SERIOUS COMORBIDITY AND BODY MASS INDEX (BMI) OF 31.0 TO 31.9 IN ADULT, UNSPECIFIED OBESITY TYPE: ICD-10-CM

## 2025-03-06 NOTE — PROGRESS NOTES
Clinical Pharmacy Appointment    Patient ID: Seamus Damon is a 45 y.o. male who presents for Weight Loss.    Pt is here for follow up appointment.     Referring Provider: Clair Cobian, *  PCP: Clair Cobian DO   Last visit with PCP: 10/23/2024   Next visit with PCP: 4/24/2025      Subjective     Interval History  Patient was started on Zepbound 5 mg because it was cheaper through insurance to fill 3 months at a time.      HPI  Starting Weight: 207 lbs   Current Weight: 185 lbs     -  The 10-year ASCVD risk score (Sana ARANA, et al., 2019) is: 4.3%    Values used to calculate the score:      Age: 45 years      Sex: Male      Is Non- : No      Diabetic: No      Tobacco smoker: No      Systolic Blood Pressure: 143 mmHg      Is BP treated: Yes      HDL Cholesterol: 47.2 mg/dL      Total Cholesterol: 214 mg/dL     Pertinent PMH Review:  - PMH of Pancreatitis: No  - PMH/FH of Medullary Thyroid Cancer: No  - PMH of Retinopathy: No    Drug Interactions  No relevant drug interactions were noted.    Medication System Management  Patient's preferred pharmacy: [facility] in [place]   Adherence/Organization: None  Affordability/Accessibility: None       Objective   Allergies   Allergen Reactions    Feathers Other     Sinus mucous- down feathers    Prednisone Rash     Social History     Social History Narrative    Not on file      Medication Review  Current Outpatient Medications   Medication Instructions    levothyroxine (SYNTHROID, LEVOXYL) 125 mcg, oral, Daily    lisinopriL-hydrochlorothiazide 10-12.5 mg tablet 1 tablet, oral, Daily    sertraline (ZOLOFT) 200 mg, oral, Nightly    tirzepatide (weight loss) (ZEPBOUND) 5 mg, subcutaneous, Every 7 days      Vitals  BP Readings from Last 2 Encounters:   10/26/24 (!) 143/91   10/23/24 127/83     BMI Readings from Last 1 Encounters:   10/26/24 31.47 kg/m²      Labs  A1C  Lab Results   Component Value Date    HGBA1C 5.0 05/03/2019     BMP  Lab  "Results   Component Value Date    CALCIUM 9.9 04/04/2024     04/04/2024    K 4.3 04/04/2024    CO2 31 04/04/2024     04/04/2024    BUN 17 04/04/2024    CREATININE 0.88 04/04/2024    EGFR >90 04/04/2024     LFTs  Lab Results   Component Value Date    ALT 16 04/04/2024    AST 16 04/04/2024    ALKPHOS 61 04/04/2024    BILITOT 0.5 04/04/2024     FLP  Lab Results   Component Value Date    TRIG 135 04/04/2024    CHOL 214 (H) 04/04/2024    LDLF 137 (H) 02/06/2023    LDLCALC 140 (H) 04/04/2024    HDL 47.2 04/04/2024     Urine Microalbumin  No results found for: \"MICROALBCREA\"  Weight Management  Wt Readings from Last 3 Encounters:   10/26/24 93.9 kg (207 lb)   10/23/24 93.8 kg (206 lb 12.8 oz)   07/30/24 93.9 kg (207 lb)      There is no height or weight on file to calculate BMI.     Assessment/Plan   Problem List Items Addressed This Visit       Hyperlipidemia    Hypertension    DM (obstructive sleep apnea)    Class 1 obesity with serious comorbidity and body mass index (BMI) of 31.0 to 31.9 in adult     Patient was referred for initiation of a GLP-1 agonist for weight loss.  Since last visit the patient continued Zepbound and reports no side effects.  The patient has also noticed a decrease in appetite/ portion control, more energy, and his clothes fitting looser.  The patient has a few more weeks of Zepbound 5 mg so will continue current dose.      PLAN:   - Continue Zepbound 5 mg once weekly injection.           Clinical Pharmacist follow-up: 5 weeks , Telehealth visit    Continue all meds under the continuation of care with the referring provider and clinical pharmacy team.    Thank you,  Jeannie Gibson, PharmD   Clinical Pharmacist  [phone]    Verbal consent to manage patient's drug therapy was obtained from the patient. They were informed they may decline to participate or withdraw from participation in pharmacy services at any time.  "

## 2025-03-06 NOTE — ASSESSMENT & PLAN NOTE
Patient was referred for initiation of a GLP-1 agonist for weight loss.  Since last visit the patient continued Zepbound and reports no side effects.  The patient has also noticed a decrease in appetite/ portion control, more energy, and his clothes fitting looser.  The patient has a few more weeks of Zepbound 5 mg so will continue current dose.      PLAN:   - Continue Zepbound 5 mg once weekly injection.

## 2025-04-10 ENCOUNTER — APPOINTMENT (OUTPATIENT)
Dept: PHARMACY | Facility: HOSPITAL | Age: 45
End: 2025-04-10
Payer: COMMERCIAL

## 2025-04-10 DIAGNOSIS — I10 HYPERTENSION, UNSPECIFIED TYPE: ICD-10-CM

## 2025-04-10 DIAGNOSIS — E78.5 HYPERLIPIDEMIA, UNSPECIFIED HYPERLIPIDEMIA TYPE: ICD-10-CM

## 2025-04-10 DIAGNOSIS — E66.811 CLASS 1 OBESITY WITH SERIOUS COMORBIDITY AND BODY MASS INDEX (BMI) OF 31.0 TO 31.9 IN ADULT, UNSPECIFIED OBESITY TYPE: ICD-10-CM

## 2025-04-10 DIAGNOSIS — G47.33 OSA (OBSTRUCTIVE SLEEP APNEA): ICD-10-CM

## 2025-04-14 NOTE — ASSESSMENT & PLAN NOTE
Patient was referred for initiation of a GLP-1 agonist for weight loss.  Since last visit the patient continued Zepbound and reports no side effects.  The patient has also noticed a decrease in appetite/ portion control, more energy, and his clothes fitting looser.  The patient has a few more weeks of Zepbound 5 mg however feels that the medication is not lasting the full week anymore.  Discussed increasing the dose and the patient was agreeable.        PLAN:   - INCREASE to Zepbound 7.5 mg once weekly injection.

## 2025-04-14 NOTE — PROGRESS NOTES
Clinical Pharmacy Appointment    Patient ID: Seamus Damon is a 45 y.o. male who presents for Weight Loss.    Pt is here for follow up appointment.     Referring Provider: Clair Cobian, *  PCP: Lindsey Harris DO   Last visit with PCP: 10/23/2024   Next visit with PCP: 4/24/2025      Subjective   HPI  Starting Weight: 207 lbs   Current Weight: 183 lbs     -  The 10-year ASCVD risk score (Sana ARANA, et al., 2019) is: 3.6%    Values used to calculate the score:      Age: 45 years      Sex: Male      Is Non- : No      Diabetic: No      Tobacco smoker: No      Systolic Blood Pressure: 143 mmHg      Is BP treated: No      HDL Cholesterol: 47.2 mg/dL      Total Cholesterol: 214 mg/dL     Pertinent PMH Review:  - PMH of Pancreatitis: No  - PMH/FH of Medullary Thyroid Cancer: No  - PMH of Retinopathy: No    Drug Interactions  No relevant drug interactions were noted.    Medication System Management  Patient's preferred pharmacy: [facility] in [place]   Adherence/Organization: None  Affordability/Accessibility: None       Objective   Allergies   Allergen Reactions    Feathers Other     Sinus mucous- down feathers    Prednisone Rash     Social History     Social History Narrative    Not on file      Medication Review  Current Outpatient Medications   Medication Instructions    levothyroxine (SYNTHROID, LEVOXYL) 125 mcg, oral, Daily    lisinopriL-hydrochlorothiazide 10-12.5 mg tablet 1 tablet, oral, Daily    sertraline (ZOLOFT) 200 mg, oral, Nightly    tirzepatide (weight loss) (ZEPBOUND) 7.5 mg, subcutaneous, Every 7 days      Vitals  BP Readings from Last 2 Encounters:   10/26/24 (!) 143/91   10/23/24 127/83     BMI Readings from Last 1 Encounters:   10/26/24 31.47 kg/m²      Labs  A1C  Lab Results   Component Value Date    HGBA1C 5.0 05/03/2019     BMP  Lab Results   Component Value Date    CALCIUM 9.9 04/04/2024     04/04/2024    K 4.3 04/04/2024    CO2 31 04/04/2024      "04/04/2024    BUN 17 04/04/2024    CREATININE 0.88 04/04/2024    EGFR >90 04/04/2024     LFTs  Lab Results   Component Value Date    ALT 16 04/04/2024    AST 16 04/04/2024    ALKPHOS 61 04/04/2024    BILITOT 0.5 04/04/2024     FLP  Lab Results   Component Value Date    TRIG 135 04/04/2024    CHOL 214 (H) 04/04/2024    LDLF 137 (H) 02/06/2023    LDLCALC 140 (H) 04/04/2024    HDL 47.2 04/04/2024     Urine Microalbumin  No results found for: \"MICROALBCREA\"  Weight Management  Wt Readings from Last 3 Encounters:   10/26/24 93.9 kg (207 lb)   10/23/24 93.8 kg (206 lb 12.8 oz)   07/30/24 93.9 kg (207 lb)      There is no height or weight on file to calculate BMI.     Assessment/Plan   Problem List Items Addressed This Visit       Hyperlipidemia    Relevant Orders    Referral to Clinical Pharmacy    Hypertension    Relevant Orders    Referral to Clinical Pharmacy    DM (obstructive sleep apnea)    Relevant Medications    tirzepatide, weight loss, (Zepbound) 7.5 mg/0.5 mL injection    Other Relevant Orders    Referral to Clinical Pharmacy    Class 1 obesity with serious comorbidity and body mass index (BMI) of 31.0 to 31.9 in adult     Patient was referred for initiation of a GLP-1 agonist for weight loss.  Since last visit the patient continued Zepbound and reports no side effects.  The patient has also noticed a decrease in appetite/ portion control, more energy, and his clothes fitting looser.  The patient has a few more weeks of Zepbound 5 mg however feels that the medication is not lasting the full week anymore.  Discussed increasing the dose and the patient was agreeable.        PLAN:   - INCREASE to Zepbound 7.5 mg once weekly injection.          Relevant Medications    tirzepatide, weight loss, (Zepbound) 7.5 mg/0.5 mL injection    Other Relevant Orders    Referral to Clinical Pharmacy     Clinical Pharmacist follow-up: 5 weeks , Telehealth visit    Continue all meds under the continuation of care with the " referring provider and clinical pharmacy team.    Thank you,  Jeannie Gibson, PharmD   Clinical Pharmacist  [phone]    Verbal consent to manage patient's drug therapy was obtained from the patient. They were informed they may decline to participate or withdraw from participation in pharmacy services at any time.

## 2025-04-21 DIAGNOSIS — E03.9 HYPOTHYROIDISM, UNSPECIFIED TYPE: ICD-10-CM

## 2025-04-21 RX ORDER — LEVOTHYROXINE SODIUM 125 UG/1
125 TABLET ORAL DAILY
Qty: 90 TABLET | Refills: 0 | Status: SHIPPED | OUTPATIENT
Start: 2025-04-21

## 2025-04-21 NOTE — TELEPHONE ENCOUNTER
Pt called in on the refill line stating that he only has 2 pills left but he does see you 5/1/25.

## 2025-04-21 NOTE — TELEPHONE ENCOUNTER
1. Hypothyroidism, unspecified type  - levothyroxine (Synthroid, Levoxyl) 125 mcg tablet; Take 1 tablet (125 mcg) by mouth once daily.  Dispense: 90 tablet; Refill: 0    Lindsey Harris DO, MSEd  [facility]  Office: [phone]  4/21/2025 6:38 PM

## 2025-04-24 ENCOUNTER — APPOINTMENT (OUTPATIENT)
Dept: PRIMARY CARE | Facility: CLINIC | Age: 45
End: 2025-04-24
Payer: COMMERCIAL

## 2025-05-01 ENCOUNTER — APPOINTMENT (OUTPATIENT)
Dept: PRIMARY CARE | Facility: CLINIC | Age: 45
End: 2025-05-01
Payer: COMMERCIAL

## 2025-05-01 VITALS
BODY MASS INDEX: 28.67 KG/M2 | WEIGHT: 182.7 LBS | HEART RATE: 80 BPM | TEMPERATURE: 97.6 F | SYSTOLIC BLOOD PRESSURE: 114 MMHG | DIASTOLIC BLOOD PRESSURE: 81 MMHG | HEIGHT: 67 IN | OXYGEN SATURATION: 97 %

## 2025-05-01 DIAGNOSIS — G47.33 OSA (OBSTRUCTIVE SLEEP APNEA): ICD-10-CM

## 2025-05-01 DIAGNOSIS — I10 HYPERTENSION, ESSENTIAL: ICD-10-CM

## 2025-05-01 DIAGNOSIS — Z00.00 WELL ADULT EXAM: Primary | ICD-10-CM

## 2025-05-01 DIAGNOSIS — E03.9 HYPOTHYROIDISM, UNSPECIFIED TYPE: ICD-10-CM

## 2025-05-01 DIAGNOSIS — E66.811 CLASS 1 OBESITY WITH SERIOUS COMORBIDITY AND BODY MASS INDEX (BMI) OF 31.0 TO 31.9 IN ADULT, UNSPECIFIED OBESITY TYPE: ICD-10-CM

## 2025-05-01 DIAGNOSIS — F41.8 DEPRESSION WITH ANXIETY: ICD-10-CM

## 2025-05-01 PROCEDURE — 99213 OFFICE O/P EST LOW 20 MIN: CPT | Performed by: STUDENT IN AN ORGANIZED HEALTH CARE EDUCATION/TRAINING PROGRAM

## 2025-05-01 PROCEDURE — 3008F BODY MASS INDEX DOCD: CPT | Performed by: STUDENT IN AN ORGANIZED HEALTH CARE EDUCATION/TRAINING PROGRAM

## 2025-05-01 PROCEDURE — 3074F SYST BP LT 130 MM HG: CPT | Performed by: STUDENT IN AN ORGANIZED HEALTH CARE EDUCATION/TRAINING PROGRAM

## 2025-05-01 PROCEDURE — 99396 PREV VISIT EST AGE 40-64: CPT | Performed by: STUDENT IN AN ORGANIZED HEALTH CARE EDUCATION/TRAINING PROGRAM

## 2025-05-01 PROCEDURE — 3079F DIAST BP 80-89 MM HG: CPT | Performed by: STUDENT IN AN ORGANIZED HEALTH CARE EDUCATION/TRAINING PROGRAM

## 2025-05-01 RX ORDER — LISINOPRIL AND HYDROCHLOROTHIAZIDE 10; 12.5 MG/1; MG/1
1 TABLET ORAL DAILY
Qty: 90 TABLET | Refills: 3 | Status: SHIPPED | OUTPATIENT
Start: 2025-05-01 | End: 2026-05-01

## 2025-05-01 RX ORDER — LEVOTHYROXINE SODIUM 125 UG/1
125 TABLET ORAL DAILY
Qty: 90 TABLET | Refills: 3 | Status: SHIPPED | OUTPATIENT
Start: 2025-05-01

## 2025-05-01 RX ORDER — SERTRALINE HYDROCHLORIDE 100 MG/1
200 TABLET, FILM COATED ORAL NIGHTLY
Qty: 180 TABLET | Refills: 3 | Status: SHIPPED | OUTPATIENT
Start: 2025-05-01

## 2025-05-01 ASSESSMENT — PATIENT HEALTH QUESTIONNAIRE - PHQ9
1. LITTLE INTEREST OR PLEASURE IN DOING THINGS: NOT AT ALL
6. FEELING BAD ABOUT YOURSELF - OR THAT YOU ARE A FAILURE OR HAVE LET YOURSELF OR YOUR FAMILY DOWN: NOT AT ALL
9. THOUGHTS THAT YOU WOULD BE BETTER OFF DEAD, OR OF HURTING YOURSELF: NOT AT ALL
3. TROUBLE FALLING OR STAYING ASLEEP OR SLEEPING TOO MUCH: MORE THAN HALF THE DAYS
5. POOR APPETITE OR OVEREATING: NOT AT ALL
SUM OF ALL RESPONSES TO PHQ QUESTIONS 1-9: 5
4. FEELING TIRED OR HAVING LITTLE ENERGY: SEVERAL DAYS
SUM OF ALL RESPONSES TO PHQ9 QUESTIONS 1 AND 2: 2
7. TROUBLE CONCENTRATING ON THINGS, SUCH AS READING THE NEWSPAPER OR WATCHING TELEVISION: NOT AT ALL
2. FEELING DOWN, DEPRESSED OR HOPELESS: MORE THAN HALF THE DAYS
8. MOVING OR SPEAKING SO SLOWLY THAT OTHER PEOPLE COULD HAVE NOTICED. OR THE OPPOSITE, BEING SO FIGETY OR RESTLESS THAT YOU HAVE BEEN MOVING AROUND A LOT MORE THAN USUAL: NOT AT ALL

## 2025-05-01 ASSESSMENT — ANXIETY QUESTIONNAIRES
2. NOT BEING ABLE TO STOP OR CONTROL WORRYING: SEVERAL DAYS
1. FEELING NERVOUS, ANXIOUS, OR ON EDGE: MORE THAN HALF THE DAYS
GAD7 TOTAL SCORE: 6
7. FEELING AFRAID AS IF SOMETHING AWFUL MIGHT HAPPEN: NOT AT ALL
5. BEING SO RESTLESS THAT IT IS HARD TO SIT STILL: NOT AT ALL
3. WORRYING TOO MUCH ABOUT DIFFERENT THINGS: SEVERAL DAYS
4. TROUBLE RELAXING: SEVERAL DAYS
IF YOU CHECKED OFF ANY PROBLEMS ON THIS QUESTIONNAIRE, HOW DIFFICULT HAVE THESE PROBLEMS MADE IT FOR YOU TO DO YOUR WORK, TAKE CARE OF THINGS AT HOME, OR GET ALONG WITH OTHER PEOPLE: NOT DIFFICULT AT ALL
6. BECOMING EASILY ANNOYED OR IRRITABLE: SEVERAL DAYS

## 2025-05-01 NOTE — PATIENT INSTRUCTIONS
VISIT SUMMARY:  Today, we discussed your current medications, overall health, and follow-up plans. We reviewed your experience with Zepbound and Zoloft, addressed your past surgery, and planned for upcoming thyroid function labs.    YOUR PLAN:  -OBESITY: Obesity is a condition characterized by excessive body fat. You are currently taking Zepbound 7.5 mg for weight management. You mentioned experiencing nausea and constipation, which are side effects of the medication. Please continue taking Zepbound 7.5 mg and monitor for any signs of pancreatitis, such as severe abdominal pain, nausea, and vomiting. We may consider adjusting the day you take the medication to see if it helps with the side effects.    -DEPRESSION: Depression is a mood disorder that causes persistent feelings of sadness and loss of interest. You have been on Zoloft for a long time. We discussed the possibility of reducing your dose in the future to see if it is still effective. For now, please continue with your current dose.    -COLON POLYP: A colon polyp is a small clump of cells that forms on the lining of the colon. You had a polyp removed last year and are not currently experiencing any issues. You do not need another colonoscopy for 8 years unless you develop symptoms.    -GENERAL HEALTH MAINTENANCE: We discussed the need for thyroid function labs, which are important to monitor your thyroid health. 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 We will adjust your Synthroid dosage based on the lab results.    INSTRUCTIONS:  Please monitor for any signs of pancreatitis and report them immediately. 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 Continue with your current medications and follow up with any new symptoms or concerns.

## 2025-05-08 DIAGNOSIS — I10 HYPERTENSION, ESSENTIAL: ICD-10-CM

## 2025-05-11 RX ORDER — LISINOPRIL AND HYDROCHLOROTHIAZIDE 10; 12.5 MG/1; MG/1
1 TABLET ORAL DAILY
Qty: 90 TABLET | Refills: 3 | OUTPATIENT
Start: 2025-05-11

## 2025-05-11 NOTE — TELEPHONE ENCOUNTER
BRIEF NOTE    Subjective/Objective:  Pharmacy refill request. [facility] requesting again. Already sent 1 yr supply on 5/1/25.     Assessment/Plan:  - Rx sent. Duplicate request.     No problem-specific Assessment & Plan notes found for this encounter.        Lindsey Harris DO, Mert  [facility]  Office: [phone]  5/11/2025 3:31 PM

## 2025-05-11 NOTE — PROGRESS NOTES
FAMILY MEDICINE  WELL ADULT VISIT   Seamus Damon  [id number]  1980    PCP: Lindsey Harris DO     Chief Complaint:   Chief Complaint   Patient presents with    Annual Exam     Pt presents for annual physical exam- ABN was given to pt and signed, pt verbalized understanding. Pt states that he would just like to check in with you and see how you think the zepbound is going for him.      SUBJECTIVE     Seamus Damon is a 45 y.o. English-speaking male with pertinent PMHx of HTN, who presents to the clinic for well adult exam.     Patient is new to me as PCP, as Dr. Cobian left the practice in January.     Went to [facility] [place]   Knows Gabriel Monge, who shot at [facility] [place]     HPI    History of Present Illness  Seamus Damon is a 45 year old male who presents for follow-up regarding his Zepbound medication and overall health management.    He recently increased his Zepbound dosage to 7.5 mg two weeks ago due to increased hunger, particularly on Fridays and Saturdays. He takes the medication on Sunday mornings, sometimes in the afternoon, and experiences nausea on Mondays and Tuesdays, which he finds manageable. He also notes a change in bowel habits, describing constipation compared to his previous frequency of two to three bowel movements per day, now reduced to once daily.    He has been on Zoloft for an extended period and is uncertain about its effectiveness, as he has not experienced life without it. He experiences rage, particularly at home with his children, but rarely at work. He recalls an incident where he yelled at a student, which was unusual for him.    He underwent surgery in June of the previous year to remove a polyp that had grown from the inside to the outside of his body, which he humorously describes as 'growing a tail.' Post-surgery, he experiences dryness and itchiness in the area, particularly in cold weather, and uses Vaseline for relief.    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 He is currently on Synthroid.    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 His wife is a teacher for visually impaired students in a different district. No significant alcohol consumption, estimating about twelve drinks per year.      Cancer Screening  - Pap Smear (21-28yo, 30-64yo, per ASCCP): NI  - Mammogram (Biennial, 40-75yo): NI  - Colonoscopy (start 46yo):    - [facility]    - 7/2023: q5y f/u  - Low dose CT (50-79yo w 20pk, current or quit w/in 15yr): NI  - PSA (55-68yo M): NYI    CVD   - BMI: Body mass index is 28.61 kg/m².  - ASCVD: The 10-year ASCVD risk score (Sana ARANA, et al., 2019) is: 2.9%    Values used to calculate the score:      Age: 45 years      Sex: Male      Is Non- : No      Diabetic: No      Tobacco smoker: No      Systolic Blood Pressure: 114 mmHg      Is BP treated: Yes      HDL Cholesterol: 47.2 mg/dL      Total Cholesterol: 214 mg/dL  - BP: 114/81  - DM: Last A1c due  - Cholesterol: Last lipid panel due    Immunizations  - Tdap (q10y): 11/2021  - COVID (4yo+): 10/2024  - Influenza (annual): 10/2024  - HPV (9-46yo): graduated  - Shingrix (>51yo): NYI  - PNA (>51yo): NYI    Other Screening  - Depression: PHQ-9    - MARYJANE-7 Total Score: 6 (5/1/2025 11:00 AM)   - Zoloft 200mg every day   - Osteoporosis (Dexa >64yo, q2h if abnl): NI  - AAA (M 65-76yo ever smoke): NI  - HIV (15-64yo, once in lifetime):   - Hep C (18-80yo, once in lifetime):   - Syphilis (people at increased risk):   - GC/CT (F sexually active <24yo, >24yo at increased risk):     Social Hx  - Dental Hx: goes  - Vision Hx: goes  - Occupation: Teacher [employer]        The following portions of the patient's chart were reviewed in this encounter and updated as appropriate:  Tobacco  Allergies  Meds  Problems  Med Hx  Surg Hx  Fam Hx     .     Social Hx Social  "History[1]   Medical Hx Medical History[2]  Problem List[3]   Allergies Allergies[4]   Surgical Hx Surgical History[5]   Family Hx Family History[6]   Immunizations Immunization History   Administered Date(s) Administered    COVID-19, mRNA, LNP-S, PF, 30 mcg/0.3 mL dose 02/04/2021, 02/26/2021, 10/12/2021    Flu vaccine (IIV4), preservative free *Check age/dose* 10/20/2015, 09/06/2017, 11/02/2018, 10/29/2020, 10/10/2021, 11/06/2022, 10/01/2023    Flu vaccine, trivalent, preservative free, age 6 months and greater (Fluarix/Fluzone/Flulaval) 10/11/2024    Hepatitis A vaccine, age 19 years and greater (HAVRIX) 05/03/2019, 11/07/2019    Influenza, Unspecified 10/26/2019    MMR vaccine, subcutaneous (MMR II) 05/09/2019, 06/07/2019    Moderna COVID-19 vaccine, 12 years and older (50mcg/0.5mL)(Spikevax) 10/01/2023, 10/11/2024    Pfizer COVID-19 vaccine, bivalent, age 12 years and older (30 mcg/0.3 mL) 12/29/2022    Pfizer Gray Cap SARS-CoV-2 05/11/2022    Tdap vaccine, age 7 year and older (BOOSTRIX, ADACEL) 11/18/2021      Medication List Current Outpatient Medications   Medication Instructions    levothyroxine (SYNTHROID, LEVOXYL) 125 mcg, oral, Daily    lisinopriL-hydrochlorothiazide 10-12.5 mg tablet 1 tablet, oral, Daily    sertraline (ZOLOFT) 200 mg, oral, Nightly    tirzepatide (weight loss) (ZEPBOUND) 7.5 mg, subcutaneous, Every 7 days          OBJECTIVE   /81 (BP Location: Left arm, Patient Position: Sitting, BP Cuff Size: Adult)   Pulse 80   Temp 36.4 °C (97.6 °F) (Temporal)   Ht 1.702 m (5' 7\")   Wt 82.9 kg (182 lb 11.2 oz)   SpO2 97%   BMI 28.61 kg/m²   Vital signs and pulse oximetry reviewed.     Physical Exam  Vitals and nursing note reviewed.   Constitutional:       Appearance: Normal appearance.   HENT:      Head: Normocephalic and atraumatic.      Right Ear: Tympanic membrane, ear canal and external ear normal.      Left Ear: Tympanic membrane, ear canal and external ear normal.      Nose: Nose " normal. No congestion or rhinorrhea.   Eyes:      General: No scleral icterus.     Conjunctiva/sclera: Conjunctivae normal.   Cardiovascular:      Rate and Rhythm: Normal rate and regular rhythm.      Heart sounds: No murmur heard.  Pulmonary:      Effort: Pulmonary effort is normal. No respiratory distress.      Breath sounds: Normal breath sounds. No wheezing, rhonchi or rales.   Musculoskeletal:      Cervical back: No rigidity or tenderness.      Right lower leg: No edema.      Left lower leg: No edema.   Lymphadenopathy:      Cervical: No cervical adenopathy.   Skin:     General: Skin is warm.      Coloration: Skin is not jaundiced.   Neurological:      Mental Status: He is alert. Mental status is at baseline.   Psychiatric:         Mood and Affect: Mood normal.         Behavior: Behavior normal.       Physical Exam  CHEST: Diaphragmatic excursions normal.    Results  DIAGNOSTIC  Colonoscopy: Polyp removed    PATHOLOGY  Polyp (June 2024)      ASSESSMENT & PLAN     Problem List Items Addressed This Visit       Class 1 obesity with serious comorbidity and body mass index (BMI) of 31.0 to 31.9 in adult    Depression with anxiety    Overview   Patient thinks may be some possible mild OCD  FMHx of anxiety/depression on mom's side, cousin with severe OCD    Current regimen:  Sertraline 200 mg daily         Relevant Medications    sertraline (Zoloft) 100 mg tablet    Hypothyroidism    Overview   Currently on Synthroid therapy via PCP         Relevant Medications    levothyroxine (Synthroid, Levoxyl) 125 mcg tablet    Other Relevant Orders    Follow Up In [facility] - PCP - Established    DM (obstructive sleep apnea)    Overview   Sleep study years ago, failed CPAP therapy (intolerance).         Well adult exam - Primary     Other Visit Diagnoses         Hypertension, essential        Relevant Medications    lisinopriL-hydrochlorothiazide 10-12.5 mg tablet    Other Relevant Orders    Follow Up In [facility] - PCP - Established            Assessment & Plan  Obesity  On Zepbound 7.5 mg for weight management. Experiencing nausea and constipation. Advised to monitor for pancreatitis.  - Continue Zepbound 7.5 mg.  - Monitor for pancreatitis signs.  - Consider adjusting administration day.    Depression  On Zoloft long-term. Discussed potential dose reduction to assess effectiveness.  - Continue current Zoloft dose.  - Consider future dose reduction.    Colon polyp  Previously removed, no current issues. Colonoscopy not due for 8 years unless symptoms arise.  - Schedule colonoscopy in 8 years unless symptoms develop.    General Health Maintenance  Due for thyroid function labs. Discussed potential Synthroid adjustment.  - Order thyroid function labs, fasting if possible.  - Adjust Synthroid based on lab results.    PREVENT  Level 3    Follow-Up Recommendations: 6mo    Please excuse any typos or grammatical errors, part of this note was constructed with Dragon dictation software.    This medical note was created with the assistance of artificial intelligence (AI) for documentation purposes. The content has been reviewed and confirmed by the healthcare provider for accuracy and completeness. Patient consented to the use of audio recording and use of AI during their visit.         Lindsey Harris DO, MSEd  [facility]   Office: [phone]  5/11/2025 5:20 PM         [1]   Social History  Socioeconomic History    Marital status:    Tobacco Use    Smoking status: Never    Smokeless tobacco: Never   Vaping Use    Vaping status: Never Used   Substance and Sexual Activity    Alcohol use: Yes     Comment: occasionally    Drug use: Never   [2]   Past Medical History:  Diagnosis Date    Personal history of other diseases of the circulatory system     History of hypertension    Personal history of other diseases of the nervous system and sense organs     History of sleep apnea    Personal history of  other endocrine, nutritional and metabolic disease     History of obesity    Personal history of other endocrine, nutritional and metabolic disease     History of hypothyroidism    Personal history of other specified conditions     History of fatigue   [3]   Patient Active Problem List  Diagnosis    Depression with anxiety    Hyperlipidemia    Hypertension    Hypothyroidism    DM (obstructive sleep apnea)    Situational anxiety    Well adult exam    Anal polyp    Class 1 obesity with serious comorbidity and body mass index (BMI) of 31.0 to 31.9 in adult    Intolerance of continuous positive airway pressure (CPAP) ventilation   [4]   Allergies  Allergen Reactions    Feathers Other     Sinus mucous- down feathers    Prednisone Rash   [5] History reviewed. No pertinent surgical history.  [6]   Family History  Problem Relation Name Age of Onset    Hyperlipidemia Mother      Hypertension Mother      Skin cancer Mother      Prostate cancer Paternal Grandfather      Heart failure Other Grandmother     Heart failure Other Grandfather     Coronary artery disease Paternal Great-Grandfather

## 2025-05-22 ENCOUNTER — APPOINTMENT (OUTPATIENT)
Dept: PHARMACY | Facility: HOSPITAL | Age: 45
End: 2025-05-22
Payer: COMMERCIAL

## 2025-05-22 DIAGNOSIS — E66.811 CLASS 1 OBESITY WITH SERIOUS COMORBIDITY AND BODY MASS INDEX (BMI) OF 31.0 TO 31.9 IN ADULT, UNSPECIFIED OBESITY TYPE: ICD-10-CM

## 2025-05-22 DIAGNOSIS — I10 HYPERTENSION, UNSPECIFIED TYPE: ICD-10-CM

## 2025-05-22 DIAGNOSIS — G47.33 OSA (OBSTRUCTIVE SLEEP APNEA): ICD-10-CM

## 2025-05-22 DIAGNOSIS — E78.5 HYPERLIPIDEMIA, UNSPECIFIED HYPERLIPIDEMIA TYPE: ICD-10-CM

## 2025-05-23 NOTE — PROGRESS NOTES
Clinical Pharmacy Appointment    Patient ID: Seamus Damon is a 45 y.o. male who presents for Weight Loss.    Pt is here for follow up appointment.     Referring Provider: Lindsey Harris DO  PCP: Lindsey Harris DO   Last visit with PCP: 10/23/2024   Next visit with PCP: 4/24/2025      Subjective   HPI  Starting Weight: 207 lbs   Current Weight: 181 lbs     -  The 10-year ASCVD risk score (Sana ARANA, et al., 2019) is: 2.9%    Values used to calculate the score:      Age: 45 years      Sex: Male      Is Non- : No      Diabetic: No      Tobacco smoker: No      Systolic Blood Pressure: 114 mmHg      Is BP treated: Yes      HDL Cholesterol: 47.2 mg/dL      Total Cholesterol: 214 mg/dL     Pertinent PMH Review:  - PMH of Pancreatitis: No  - PMH/FH of Medullary Thyroid Cancer: No  - PMH of Retinopathy: No    Drug Interactions  No relevant drug interactions were noted.    Medication System Management  Patient's preferred pharmacy: [facility] in [place]   Adherence/Organization: None  Affordability/Accessibility: None       Objective   Allergies   Allergen Reactions    Feathers Other     Sinus mucous- down feathers    Prednisone Rash     Social History     Social History Narrative    Not on file      Medication Review  Current Outpatient Medications   Medication Instructions    levothyroxine (SYNTHROID, LEVOXYL) 125 mcg, oral, Daily    lisinopriL-hydrochlorothiazide 10-12.5 mg tablet 1 tablet, oral, Daily    sertraline (ZOLOFT) 200 mg, oral, Nightly    tirzepatide (weight loss) (ZEPBOUND) 7.5 mg, subcutaneous, Every 7 days      Vitals  BP Readings from Last 2 Encounters:   05/01/25 114/81   10/26/24 (!) 143/91     BMI Readings from Last 1 Encounters:   05/01/25 28.61 kg/m²      Labs  A1C  Lab Results   Component Value Date    HGBA1C 5.0 05/03/2019     BMP  Lab Results   Component Value Date    CALCIUM 9.9 04/04/2024     04/04/2024    K 4.3 04/04/2024    CO2 31 04/04/2024     04/04/2024  "   BUN 17 04/04/2024    CREATININE 0.88 04/04/2024    EGFR >90 04/04/2024     LFTs  Lab Results   Component Value Date    ALT 16 04/04/2024    AST 16 04/04/2024    ALKPHOS 61 04/04/2024    BILITOT 0.5 04/04/2024     FLP  Lab Results   Component Value Date    TRIG 135 04/04/2024    CHOL 214 (H) 04/04/2024    LDLF 137 (H) 02/06/2023    LDLCALC 140 (H) 04/04/2024    HDL 47.2 04/04/2024     Urine Microalbumin  No results found for: \"MICROALBCREA\"  Weight Management  Wt Readings from Last 3 Encounters:   05/01/25 82.9 kg (182 lb 11.2 oz)   10/26/24 93.9 kg (207 lb)   10/23/24 93.8 kg (206 lb 12.8 oz)      There is no height or weight on file to calculate BMI.     Assessment/Plan   Problem List Items Addressed This Visit       Hyperlipidemia    Relevant Orders    Referral to Clinical Pharmacy    Hypertension    Relevant Orders    Referral to Clinical Pharmacy    DM (obstructive sleep apnea)    Relevant Medications    tirzepatide, weight loss, (Zepbound) 7.5 mg/0.5 mL injection    Other Relevant Orders    Referral to Clinical Pharmacy    Class 1 obesity with serious comorbidity and body mass index (BMI) of 31.0 to 31.9 in adult    Patient was referred for initiation of a GLP-1 agonist for weight loss.  Since last visit the patient increased the dose of Zepbound and reports no side effects.  The patient has also noticed a decrease in appetite/ portion control, more energy, and his clothes fitting looser.  With patient consistently losing weight, will continue current dose.       PLAN:   - Continue Zepbound 7.5 mg once weekly injection.          Relevant Medications    tirzepatide, weight loss, (Zepbound) 7.5 mg/0.5 mL injection    Other Relevant Orders    Referral to Clinical Pharmacy     Clinical Pharmacist follow-up: 6 months , Telehealth visit    Continue all meds under the continuation of care with the referring provider and clinical pharmacy team.    Thank you,  Jeannie Gibson, PharmD   Clinical " Pharmacist  [phone]    Verbal consent to manage patient's drug therapy was obtained from the patient. They were informed they may decline to participate or withdraw from participation in pharmacy services at any time.

## 2025-05-23 NOTE — ASSESSMENT & PLAN NOTE
Patient was referred for initiation of a GLP-1 agonist for weight loss.  Since last visit the patient increased the dose of Zepbound and reports no side effects.  The patient has also noticed a decrease in appetite/ portion control, more energy, and his clothes fitting looser.  With patient consistently losing weight, will continue current dose.       PLAN:   - Continue Zepbound 7.5 mg once weekly injection.

## 2025-06-26 ENCOUNTER — PATIENT MESSAGE (OUTPATIENT)
Dept: PRIMARY CARE | Facility: CLINIC | Age: 45
End: 2025-06-26
Payer: COMMERCIAL

## 2025-06-26 DIAGNOSIS — G47.33 OSA (OBSTRUCTIVE SLEEP APNEA): ICD-10-CM

## 2025-06-26 DIAGNOSIS — E78.5 HYPERLIPIDEMIA, UNSPECIFIED HYPERLIPIDEMIA TYPE: ICD-10-CM

## 2025-06-26 DIAGNOSIS — F41.8 DEPRESSION WITH ANXIETY: ICD-10-CM

## 2025-06-26 DIAGNOSIS — E66.09 CLASS 1 OBESITY DUE TO EXCESS CALORIES WITH SERIOUS COMORBIDITY AND BODY MASS INDEX (BMI) OF 31.0 TO 31.9 IN ADULT: ICD-10-CM

## 2025-06-26 DIAGNOSIS — F41.8 SITUATIONAL ANXIETY: ICD-10-CM

## 2025-06-26 DIAGNOSIS — E03.9 HYPOTHYROIDISM, UNSPECIFIED TYPE: ICD-10-CM

## 2025-06-26 DIAGNOSIS — E66.811 CLASS 1 OBESITY DUE TO EXCESS CALORIES WITH SERIOUS COMORBIDITY AND BODY MASS INDEX (BMI) OF 31.0 TO 31.9 IN ADULT: ICD-10-CM

## 2025-06-26 DIAGNOSIS — I10 HYPERTENSION, UNSPECIFIED TYPE: ICD-10-CM

## 2025-06-26 DIAGNOSIS — Z00.00 WELL ADULT EXAM: Primary | ICD-10-CM

## 2025-06-28 NOTE — PATIENT COMMUNICATION
1. Well adult exam (Primary)  - TSH with reflex to Free T4 if abnormal; Future  - Vitamin B12; Future  - Vitamin D 25-Hydroxy,Total (for eval of Vitamin D levels); Future  - CBC and Auto Differential; Future  - Comprehensive Metabolic Panel; Future  - Hemoglobin A1C; Future  - Lipid Panel; Future  - TSH with reflex to Free T4 if abnormal  - Vitamin B12  - Vitamin D 25-Hydroxy,Total (for eval of Vitamin D levels)  - CBC and Auto Differential  - Comprehensive Metabolic Panel  - Hemoglobin A1C  - Lipid Panel    2. Situational anxiety  - TSH with reflex to Free T4 if abnormal; Future  - Vitamin B12; Future  - Vitamin D 25-Hydroxy,Total (for eval of Vitamin D levels); Future  - CBC and Auto Differential; Future  - Comprehensive Metabolic Panel; Future  - Hemoglobin A1C; Future  - Lipid Panel; Future  - TSH with reflex to Free T4 if abnormal  - Vitamin B12  - Vitamin D 25-Hydroxy,Total (for eval of Vitamin D levels)  - CBC and Auto Differential  - Comprehensive Metabolic Panel  - Hemoglobin A1C  - Lipid Panel    3. DM (obstructive sleep apnea)  - TSH with reflex to Free T4 if abnormal; Future  - Vitamin B12; Future  - Vitamin D 25-Hydroxy,Total (for eval of Vitamin D levels); Future  - CBC and Auto Differential; Future  - Comprehensive Metabolic Panel; Future  - Hemoglobin A1C; Future  - Lipid Panel; Future  - TSH with reflex to Free T4 if abnormal  - Vitamin B12  - Vitamin D 25-Hydroxy,Total (for eval of Vitamin D levels)  - CBC and Auto Differential  - Comprehensive Metabolic Panel  - Hemoglobin A1C  - Lipid Panel    4. Hypothyroidism, unspecified type  - TSH with reflex to Free T4 if abnormal; Future  - Vitamin B12; Future  - Vitamin D 25-Hydroxy,Total (for eval of Vitamin D levels); Future  - CBC and Auto Differential; Future  - Comprehensive Metabolic Panel; Future  - Hemoglobin A1C; Future  - Lipid Panel; Future  - TSH with reflex to Free T4 if abnormal  - Vitamin B12  - Vitamin D 25-Hydroxy,Total (for eval of  Vitamin D levels)  - CBC and Auto Differential  - Comprehensive Metabolic Panel  - Hemoglobin A1C  - Lipid Panel    5. Hypertension, unspecified type  - TSH with reflex to Free T4 if abnormal; Future  - Vitamin B12; Future  - Vitamin D 25-Hydroxy,Total (for eval of Vitamin D levels); Future  - CBC and Auto Differential; Future  - Comprehensive Metabolic Panel; Future  - Hemoglobin A1C; Future  - Lipid Panel; Future  - TSH with reflex to Free T4 if abnormal  - Vitamin B12  - Vitamin D 25-Hydroxy,Total (for eval of Vitamin D levels)  - CBC and Auto Differential  - Comprehensive Metabolic Panel  - Hemoglobin A1C  - Lipid Panel    6. Hyperlipidemia, unspecified hyperlipidemia type  - TSH with reflex to Free T4 if abnormal; Future  - Vitamin B12; Future  - Vitamin D 25-Hydroxy,Total (for eval of Vitamin D levels); Future  - CBC and Auto Differential; Future  - Comprehensive Metabolic Panel; Future  - Hemoglobin A1C; Future  - Lipid Panel; Future  - TSH with reflex to Free T4 if abnormal  - Vitamin B12  - Vitamin D 25-Hydroxy,Total (for eval of Vitamin D levels)  - CBC and Auto Differential  - Comprehensive Metabolic Panel  - Hemoglobin A1C  - Lipid Panel    7. Depression with anxiety  - TSH with reflex to Free T4 if abnormal; Future  - Vitamin B12; Future  - Vitamin D 25-Hydroxy,Total (for eval of Vitamin D levels); Future  - CBC and Auto Differential; Future  - Comprehensive Metabolic Panel; Future  - Hemoglobin A1C; Future  - Lipid Panel; Future  - TSH with reflex to Free T4 if abnormal  - Vitamin B12  - Vitamin D 25-Hydroxy,Total (for eval of Vitamin D levels)  - CBC and Auto Differential  - Comprehensive Metabolic Panel  - Hemoglobin A1C  - Lipid Panel    8. Class 1 obesity due to excess calories with serious comorbidity and body mass index (BMI) of 31.0 to 31.9 in adult  - TSH with reflex to Free T4 if abnormal; Future  - Vitamin B12; Future  - Vitamin D 25-Hydroxy,Total (for eval of Vitamin D levels); Future  - CBC  and Auto Differential; Future  - Comprehensive Metabolic Panel; Future  - Hemoglobin A1C; Future  - Lipid Panel; Future  - TSH with reflex to Free T4 if abnormal  - Vitamin B12  - Vitamin D 25-Hydroxy,Total (for eval of Vitamin D levels)  - CBC and Auto Differential  - Comprehensive Metabolic Panel  - Hemoglobin A1C  - Lipid Panel

## 2025-07-11 ENCOUNTER — TELEPHONE (OUTPATIENT)
Dept: PRIMARY CARE | Facility: CLINIC | Age: 45
End: 2025-07-11
Payer: COMMERCIAL

## 2025-07-11 NOTE — TELEPHONE ENCOUNTER
I have not received anything, but I will forward this to Terry to see if she has received anything.     Lindsey Harris DO, MSEd  [facility]  Office: [phone]  7/11/2025 4:56 PM

## 2025-07-11 NOTE — TELEPHONE ENCOUNTER
Patient called and would like to know if pcp received a fax from [facility] for him for zepbound?

## 2025-07-14 NOTE — TELEPHONE ENCOUNTER
PA is closed, unsure if approved or not. Sending to [facility] for assistance

## 2025-07-14 NOTE — TELEPHONE ENCOUNTER
Pt called back in wanting to know has the prior authorization for zepbound been started? Sent to on call doctor

## 2025-07-16 ENCOUNTER — TELEPHONE (OUTPATIENT)
Dept: PRIMARY CARE | Facility: CLINIC | Age: 45
End: 2025-07-16

## 2025-07-16 DIAGNOSIS — E66.811 CLASS 1 OBESITY WITH SERIOUS COMORBIDITY AND BODY MASS INDEX (BMI) OF 31.0 TO 31.9 IN ADULT, UNSPECIFIED OBESITY TYPE: ICD-10-CM

## 2025-07-16 DIAGNOSIS — G47.33 OSA (OBSTRUCTIVE SLEEP APNEA): ICD-10-CM

## 2025-07-16 LAB
25(OH)D3+25(OH)D2 SERPL-MCNC: 34 NG/ML (ref 30–100)
ALBUMIN SERPL-MCNC: 4.8 G/DL (ref 3.6–5.1)
ALP SERPL-CCNC: 56 U/L (ref 36–130)
ALT SERPL-CCNC: 11 U/L (ref 9–46)
ANION GAP SERPL CALCULATED.4IONS-SCNC: 10 MMOL/L (CALC) (ref 7–17)
AST SERPL-CCNC: 14 U/L (ref 10–40)
BASOPHILS # BLD AUTO: 79 CELLS/UL (ref 0–200)
BASOPHILS NFR BLD AUTO: 1 %
BILIRUB SERPL-MCNC: 0.5 MG/DL (ref 0.2–1.2)
BUN SERPL-MCNC: 21 MG/DL (ref 7–25)
CALCIUM SERPL-MCNC: 10 MG/DL (ref 8.6–10.3)
CHLORIDE SERPL-SCNC: 97 MMOL/L (ref 98–110)
CHOLEST SERPL-MCNC: 222 MG/DL
CHOLEST/HDLC SERPL: 4.3 (CALC)
CO2 SERPL-SCNC: 31 MMOL/L (ref 20–32)
CREAT SERPL-MCNC: 1.04 MG/DL (ref 0.6–1.29)
EGFRCR SERPLBLD CKD-EPI 2021: 89 ML/MIN/1.73M2
EOSINOPHIL # BLD AUTO: 427 CELLS/UL (ref 15–500)
EOSINOPHIL NFR BLD AUTO: 5.4 %
ERYTHROCYTE [DISTWIDTH] IN BLOOD BY AUTOMATED COUNT: 13 % (ref 11–15)
EST. AVERAGE GLUCOSE BLD GHB EST-MCNC: 105 MG/DL
EST. AVERAGE GLUCOSE BLD GHB EST-SCNC: 5.8 MMOL/L
GLUCOSE SERPL-MCNC: 94 MG/DL (ref 65–99)
HBA1C MFR BLD: 5.3 %
HCT VFR BLD AUTO: 46.2 % (ref 38.5–50)
HDLC SERPL-MCNC: 52 MG/DL
HGB BLD-MCNC: 15.2 G/DL (ref 13.2–17.1)
LDLC SERPL CALC-MCNC: 145 MG/DL (CALC)
LYMPHOCYTES # BLD AUTO: 1770 CELLS/UL (ref 850–3900)
LYMPHOCYTES NFR BLD AUTO: 22.4 %
MCH RBC QN AUTO: 29.7 PG (ref 27–33)
MCHC RBC AUTO-ENTMCNC: 32.9 G/DL (ref 32–36)
MCV RBC AUTO: 90.2 FL (ref 80–100)
MONOCYTES # BLD AUTO: 766 CELLS/UL (ref 200–950)
MONOCYTES NFR BLD AUTO: 9.7 %
NEUTROPHILS # BLD AUTO: 4859 CELLS/UL (ref 1500–7800)
NEUTROPHILS NFR BLD AUTO: 61.5 %
NONHDLC SERPL-MCNC: 170 MG/DL (CALC)
PLATELET # BLD AUTO: 257 THOUSAND/UL (ref 140–400)
PMV BLD REES-ECKER: 10.2 FL (ref 7.5–12.5)
POTASSIUM SERPL-SCNC: 4.2 MMOL/L (ref 3.5–5.3)
PROT SERPL-MCNC: 7.3 G/DL (ref 6.1–8.1)
RBC # BLD AUTO: 5.12 MILLION/UL (ref 4.2–5.8)
SODIUM SERPL-SCNC: 138 MMOL/L (ref 135–146)
T4 FREE SERPL-MCNC: 1.1 NG/DL (ref 0.8–1.8)
TRIGL SERPL-MCNC: 125 MG/DL
TSH SERPL-ACNC: 7.54 MIU/L (ref 0.4–4.5)
VIT B12 SERPL-MCNC: 251 PG/ML (ref 200–1100)
WBC # BLD AUTO: 7.9 THOUSAND/UL (ref 3.8–10.8)

## 2025-07-16 NOTE — TELEPHONE ENCOUNTER
Patient called back in to check on the status on his prior authorization for his zepbound.   Sent to on call doctor    Please and thank you.

## 2025-07-18 DIAGNOSIS — E03.9 HYPOTHYROIDISM, UNSPECIFIED TYPE: Primary | ICD-10-CM

## 2025-08-21 DIAGNOSIS — E53.8 VITAMIN B12 DEFICIENCY: Primary | ICD-10-CM

## 2025-08-21 DIAGNOSIS — E03.9 HYPOTHYROIDISM, UNSPECIFIED TYPE: ICD-10-CM

## 2025-08-27 RX ORDER — LANOLIN ALCOHOL/MO/W.PET/CERES
1000 CREAM (GRAM) TOPICAL DAILY
Qty: 30 TABLET | Refills: 1 | Status: SHIPPED | OUTPATIENT
Start: 2025-08-27

## 2025-08-27 RX ORDER — LEVOTHYROXINE SODIUM 150 UG/1
150 TABLET ORAL DAILY
Qty: 90 TABLET | Refills: 1 | Status: SHIPPED | OUTPATIENT
Start: 2025-08-27

## 2025-08-29 DIAGNOSIS — E03.9 HYPOTHYROIDISM, UNSPECIFIED TYPE: ICD-10-CM

## 2025-11-04 ENCOUNTER — APPOINTMENT (OUTPATIENT)
Dept: PRIMARY CARE | Facility: CLINIC | Age: 45
End: 2025-11-04
Payer: COMMERCIAL

## 2025-11-06 ENCOUNTER — APPOINTMENT (OUTPATIENT)
Dept: PHARMACY | Facility: HOSPITAL | Age: 45
End: 2025-11-06
Payer: COMMERCIAL

## (undated) DEVICE — VESSEL LOOP, RED MAXI, 2 CARD

## (undated) DEVICE — DRESSING, GAUZE, SPONGE, 12 PLY, CURITY, 4 X 4 IN, RIGID TRAY, STERILE

## (undated) DEVICE — NEEDLE, SPINAL, 22 G X 3.5 IN, BLACK HUB

## (undated) DEVICE — NEEDLE, HYPODERMIC, SAFETYGLIDE, SHIELDING, REGULAR WALL, REGULAR BEVEL, 22 G X 1.5 IN, BLACK HUB

## (undated) DEVICE — Device

## (undated) DEVICE — DRESSING, GAUZE, PETROLATUM, XEROFORM, 5 X 9 IN, STERILE

## (undated) DEVICE — SLEEVE, VASO PRESS, CALF GARMENT, MEDIUM, GREEN

## (undated) DEVICE — BRIEF, STRETCH, XXXLARGE, MESH PANTS

## (undated) DEVICE — DRESSING, ABDOMINAL, TENDERSORB, 8 X 7-1/2 IN, STERILE

## (undated) DEVICE — ELECTRODE, ELECTROSURGICAL, COATED NEEDLE, EDGE, STERILE